# Patient Record
Sex: FEMALE | Race: WHITE | NOT HISPANIC OR LATINO | Employment: OTHER | URBAN - METROPOLITAN AREA
[De-identification: names, ages, dates, MRNs, and addresses within clinical notes are randomized per-mention and may not be internally consistent; named-entity substitution may affect disease eponyms.]

---

## 2021-08-10 ENCOUNTER — APPOINTMENT (EMERGENCY)
Dept: RADIOLOGY | Facility: HOSPITAL | Age: 21
End: 2021-08-10
Payer: COMMERCIAL

## 2021-08-10 ENCOUNTER — HOSPITAL ENCOUNTER (EMERGENCY)
Facility: HOSPITAL | Age: 21
Discharge: HOME | End: 2021-08-10
Attending: EMERGENCY MEDICINE
Payer: COMMERCIAL

## 2021-08-10 ENCOUNTER — APPOINTMENT (EMERGENCY)
Dept: VASCULAR ULTRASOUND | Facility: HOSPITAL | Age: 21
End: 2021-08-10
Payer: COMMERCIAL

## 2021-08-10 VITALS
BODY MASS INDEX: 25.69 KG/M2 | DIASTOLIC BLOOD PRESSURE: 73 MMHG | WEIGHT: 145 LBS | TEMPERATURE: 98.3 F | SYSTOLIC BLOOD PRESSURE: 129 MMHG | HEIGHT: 63 IN | OXYGEN SATURATION: 100 % | RESPIRATION RATE: 16 BRPM | HEART RATE: 97 BPM

## 2021-08-10 DIAGNOSIS — M79.604 RIGHT LEG PAIN: Primary | ICD-10-CM

## 2021-08-10 LAB
EXT PREG TEST URINE: NEGATIVE
EXT. CONTROL ED NAV: NORMAL

## 2021-08-10 PROCEDURE — 93971 EXTREMITY STUDY: CPT

## 2021-08-10 PROCEDURE — 99284 EMERGENCY DEPT VISIT MOD MDM: CPT

## 2021-08-10 PROCEDURE — 96372 THER/PROPH/DIAG INJ SC/IM: CPT

## 2021-08-10 PROCEDURE — 99284 EMERGENCY DEPT VISIT MOD MDM: CPT | Performed by: EMERGENCY MEDICINE

## 2021-08-10 PROCEDURE — 73590 X-RAY EXAM OF LOWER LEG: CPT

## 2021-08-10 PROCEDURE — 81025 URINE PREGNANCY TEST: CPT | Performed by: EMERGENCY MEDICINE

## 2021-08-10 PROCEDURE — 93971 EXTREMITY STUDY: CPT | Performed by: SURGERY

## 2021-08-10 PROCEDURE — 73552 X-RAY EXAM OF FEMUR 2/>: CPT

## 2021-08-10 RX ORDER — KETOROLAC TROMETHAMINE 30 MG/ML
15 INJECTION, SOLUTION INTRAMUSCULAR; INTRAVENOUS ONCE
Status: COMPLETED | OUTPATIENT
Start: 2021-08-10 | End: 2021-08-10

## 2021-08-10 RX ADMIN — KETOROLAC TROMETHAMINE 15 MG: 30 INJECTION, SOLUTION INTRAMUSCULAR; INTRAVENOUS at 12:41

## 2021-08-10 NOTE — ED PROVIDER NOTES
History  Chief Complaint   Patient presents with    Leg Pain     right leg pain since 0600, woke up with right sided pain starting in upper leg to calf  denies injury/trauma  denies back pain  History provided by:  Patient   used: No    Leg Pain  Associated symptoms: no fever and no neck pain      Patient is a 54-year-old female presenting to emergency department with right lower extremity pain since waking up today morning  States when she went to bed there was no pain  Pain is located from the calf up to the hip  No falls or trauma  No rashes  No skin changes  No back pain  No weakness  No numbness tingling or loss of sensation  No history of the same  Took aspirin at 6:00 a m  No recent travel  No recent hospital stays  No recent surgeries  No history of blood clots  On birth control  MDM x-ray right lower extremity, ultrasound, toradol for pain      None       History reviewed  No pertinent past medical history  History reviewed  No pertinent surgical history  History reviewed  No pertinent family history  I have reviewed and agree with the history as documented  E-Cigarette/Vaping     E-Cigarette/Vaping Substances     Social History     Tobacco Use    Smoking status: Never Smoker    Smokeless tobacco: Never Used   Substance Use Topics    Alcohol use: Not on file    Drug use: Not on file       Review of Systems   Constitutional: Negative for chills, diaphoresis and fever  HENT: Negative for congestion and sore throat  Respiratory: Negative for cough, shortness of breath, wheezing and stridor  Cardiovascular: Negative for chest pain, palpitations and leg swelling  Gastrointestinal: Negative for abdominal pain, blood in stool, diarrhea, nausea and vomiting  Genitourinary: Negative for dysuria, frequency and urgency  Musculoskeletal: Negative for neck pain and neck stiffness  Skin: Negative for pallor and rash     Neurological: Negative for dizziness, syncope, weakness, light-headedness and headaches  All other systems reviewed and are negative  Physical Exam  Physical Exam  Vitals reviewed  Constitutional:       Appearance: Normal appearance  She is well-developed  HENT:      Head: Normocephalic and atraumatic  Eyes:      Extraocular Movements: Extraocular movements intact  Pupils: Pupils are equal, round, and reactive to light  Cardiovascular:      Rate and Rhythm: Normal rate and regular rhythm  Heart sounds: Normal heart sounds  Pulmonary:      Effort: Pulmonary effort is normal  No respiratory distress  Breath sounds: Normal breath sounds  Abdominal:      General: Bowel sounds are normal       Palpations: Abdomen is soft  Tenderness: There is no abdominal tenderness  Musculoskeletal:         General: No swelling or tenderness  Normal range of motion  Cervical back: Normal range of motion and neck supple  Comments: Pain with range of motion of the right lower extremity mainly at the hip and thigh  No tenderness  No skin changes  Skin:     General: Skin is warm and dry  Capillary Refill: Capillary refill takes less than 2 seconds  Coloration: Skin is not jaundiced or pale  Neurological:      General: No focal deficit present  Mental Status: She is alert and oriented to person, place, and time           Vital Signs  ED Triage Vitals [08/10/21 1152]   Temperature Pulse Respirations Blood Pressure SpO2   98 3 °F (36 8 °C) 97 16 129/73 100 %      Temp Source Heart Rate Source Patient Position - Orthostatic VS BP Location FiO2 (%)   Oral Monitor -- Right arm --      Pain Score       6           Vitals:    08/10/21 1152   BP: 129/73   Pulse: 97         Visual Acuity      ED Medications  Medications   ketorolac (TORADOL) injection 15 mg (15 mg Intramuscular Given 8/10/21 1241)       Diagnostic Studies  Results Reviewed     Procedure Component Value Units Date/Time    POCT pregnancy, urine [[id number]]  (Normal) Resulted: 08/10/21 1226    Lab Status: Final result Updated: 08/10/21 1226     EXT PREG TEST UR (Ref: Negative) negative     Control valid                 VAS lower limb venous duplex study, unilateral/limited   Final Result by Carlin Garza DO (08/10 1333)      XR femur 2 views RIGHT   ED Interpretation by Sandy French MD (08/10 1243)   No acute abnormality      Final Result by Brenda Pak MD (08/10 1458)      No acute displaced fracture or dislocation            Workstation performed: [id number]         XR tibia fibula 2 views RIGHT   ED Interpretation by Sandy French MD (08/10 1243)   No acute abnormality      Final Result by Karime Jesus MD (08/10 1500)      No acute osseous abnormality  Workstation performed: [id number]                    Procedures  Procedures         ED Course  ED Course as of Aug 10 1656   Tue Aug 10, 2021   1316 Ultrasound negative for blood clot                                              MDM    Disposition  Final diagnoses:   Right leg pain     Time reflects when diagnosis was documented in both MDM as applicable and the Disposition within this note     Time User Action Codes Description Comment    8/10/2021  1:16 PM Symone Reynolds Add [V16 088] Right leg pain       ED Disposition     ED Disposition Condition Date/Time Comment    Discharge Stable Tue Aug 10, 2021  1:16  [facility] discharge to home/self care              Follow-up Information     Follow up With Specialties Details Why Contact Info Additional Information    [facility] Emergency Medicine  As needed, If symptoms worsen [address] Emergency Department, [address], [place], [place], Yazmin MASTERSON [address]  Call  Please call to get a family doctor and follow-up [phone]             There are no discharge medications for this patient  No discharge procedures on file      PDMP Review     None          ED Provider  Electronically Signed by           Victor M Bergeron MD  08/10/21 8554

## 2022-05-21 ENCOUNTER — HOSPITAL ENCOUNTER (EMERGENCY)
Facility: HOSPITAL | Age: 22
Discharge: HOME | End: 2022-05-22
Attending: EMERGENCY MEDICINE
Payer: COMMERCIAL

## 2022-05-21 ENCOUNTER — OFFICE VISIT (OUTPATIENT)
Dept: URGENT CARE | Facility: MEDICAL CENTER | Age: 22
End: 2022-05-21
Payer: COMMERCIAL

## 2022-05-21 VITALS
SYSTOLIC BLOOD PRESSURE: 137 MMHG | DIASTOLIC BLOOD PRESSURE: 66 MMHG | HEART RATE: 103 BPM | OXYGEN SATURATION: 96 % | TEMPERATURE: 98 F

## 2022-05-21 DIAGNOSIS — N94.9 ADNEXAL CYST: ICD-10-CM

## 2022-05-21 DIAGNOSIS — R11.2 NAUSEA & VOMITING: Primary | ICD-10-CM

## 2022-05-21 DIAGNOSIS — R11.2 NAUSEA AND VOMITING, UNSPECIFIED VOMITING TYPE: ICD-10-CM

## 2022-05-21 DIAGNOSIS — R10.9 ABDOMINAL PAIN: ICD-10-CM

## 2022-05-21 DIAGNOSIS — R10.11 RIGHT UPPER QUADRANT PAIN: Primary | ICD-10-CM

## 2022-05-21 LAB
BASOPHILS # BLD AUTO: 0.03 THOUSANDS/ΜL (ref 0–0.1)
BASOPHILS NFR BLD AUTO: 0 % (ref 0–1)
BILIRUB UR QL STRIP: NEGATIVE
CLARITY UR: CLEAR
COLOR UR: YELLOW
EOSINOPHIL # BLD AUTO: 0.11 THOUSAND/ΜL (ref 0–0.61)
EOSINOPHIL NFR BLD AUTO: 1 % (ref 0–6)
ERYTHROCYTE [DISTWIDTH] IN BLOOD BY AUTOMATED COUNT: 11.9 % (ref 11.6–15.1)
GLUCOSE UR STRIP-MCNC: NEGATIVE MG/DL
HCT VFR BLD AUTO: 42.5 % (ref 34.8–46.1)
HGB BLD-MCNC: 14.3 G/DL (ref 11.5–15.4)
HGB UR QL STRIP.AUTO: NEGATIVE
IMM GRANULOCYTES # BLD AUTO: 0.02 THOUSAND/UL (ref 0–0.2)
IMM GRANULOCYTES NFR BLD AUTO: 0 % (ref 0–2)
KETONES UR STRIP-MCNC: NEGATIVE MG/DL
LEUKOCYTE ESTERASE UR QL STRIP: ABNORMAL
LYMPHOCYTES # BLD AUTO: 3.15 THOUSANDS/ΜL (ref 0.6–4.47)
LYMPHOCYTES NFR BLD AUTO: 38 % (ref 14–44)
MCH RBC QN AUTO: 28.8 PG (ref 26.8–34.3)
MCHC RBC AUTO-ENTMCNC: 33.6 G/DL (ref 31.4–37.4)
MCV RBC AUTO: 86 FL (ref 82–98)
MONOCYTES # BLD AUTO: 0.58 THOUSAND/ΜL (ref 0.17–1.22)
MONOCYTES NFR BLD AUTO: 7 % (ref 4–12)
NEUTROPHILS # BLD AUTO: 4.5 THOUSANDS/ΜL (ref 1.85–7.62)
NEUTS SEG NFR BLD AUTO: 54 % (ref 43–75)
NITRITE UR QL STRIP: NEGATIVE
NRBC BLD AUTO-RTO: 0 /100 WBCS
PH UR STRIP.AUTO: 6 [PH]
PLATELET # BLD AUTO: 330 THOUSANDS/UL (ref 149–390)
PMV BLD AUTO: 8.7 FL (ref 8.9–12.7)
PROT UR STRIP-MCNC: ABNORMAL MG/DL
RBC # BLD AUTO: 4.96 MILLION/UL (ref 3.81–5.12)
SP GR UR STRIP.AUTO: 1.02 (ref 1–1.03)
UROBILINOGEN UR QL STRIP.AUTO: 0.2 E.U./DL
WBC # BLD AUTO: 8.39 THOUSAND/UL (ref 4.31–10.16)

## 2022-05-21 PROCEDURE — 83690 ASSAY OF LIPASE: CPT

## 2022-05-21 PROCEDURE — 85025 COMPLETE CBC W/AUTO DIFF WBC: CPT

## 2022-05-21 PROCEDURE — 36415 COLL VENOUS BLD VENIPUNCTURE: CPT

## 2022-05-21 PROCEDURE — 76705 ECHO EXAM OF ABDOMEN: CPT | Performed by: EMERGENCY MEDICINE

## 2022-05-21 PROCEDURE — 81001 URINALYSIS AUTO W/SCOPE: CPT

## 2022-05-21 PROCEDURE — 81025 URINE PREGNANCY TEST: CPT

## 2022-05-21 PROCEDURE — 99284 EMERGENCY DEPT VISIT MOD MDM: CPT | Performed by: EMERGENCY MEDICINE

## 2022-05-21 PROCEDURE — 99213 OFFICE O/P EST LOW 20 MIN: CPT | Performed by: PHYSICIAN ASSISTANT

## 2022-05-21 PROCEDURE — 80053 COMPREHEN METABOLIC PANEL: CPT

## 2022-05-21 PROCEDURE — 99284 EMERGENCY DEPT VISIT MOD MDM: CPT

## 2022-05-21 RX ORDER — ONDANSETRON 2 MG/ML
4 INJECTION INTRAMUSCULAR; INTRAVENOUS ONCE
Status: COMPLETED | OUTPATIENT
Start: 2022-05-21 | End: 2022-05-21

## 2022-05-21 RX ADMIN — SODIUM CHLORIDE 1000 ML: 0.9 INJECTION, SOLUTION INTRAVENOUS at 23:11

## 2022-05-21 RX ADMIN — ONDANSETRON 4 MG: 2 INJECTION INTRAMUSCULAR; INTRAVENOUS at 23:13

## 2022-05-21 NOTE — Clinical Note
Jamsinjazmin Ocasio was seen and treated in our emergency department on 5/21/2022  Diagnosis:     Deepika  may return to work on return date, may return to school on return date  She may return on this date: 05/25/2022    Can return sooner if feeling ok     If you have any questions or concerns, please don't hesitate to call        Oliver Berrios MD    ______________________________           _______________          _______________  Hospital Representative                              Date                                Time

## 2022-05-21 NOTE — PROGRESS NOTES
[id number]        NAME: Jorge Graf is a 24 y o  female  : 2000    MRN: [id number]  DATE: May 21, 2022  TIME: 7:46 PM    Assessment and Plan   Right upper quadrant pain [R10 11]  1  Right upper quadrant pain  Transfer to other facility   2  Nausea and vomiting, unspecified vomiting type  Transfer to other facility         Patient Instructions     Proceed to [facility] emergency department immediately  Chief Complaint     Chief Complaint   Patient presents with    Abdominal Pain     Vomiting, nausea, sharp right sided abdominal pain especially after eating         History of Present Illness       27-year-old female patient with a 3 day history of worsening right upper quadrant pain which she states is both crampy and sharp and nonradiating  She states that she has had intermittent vomiting with persistent nausea  She states that the pain is markedly worse with eating  She denies any diarrhea, urinary symptoms, vaginal symptoms  Denies any fever or chills  Denies any back pain  Patient was just at the gynecologist this past week and had a normal exam and was told that she was not pregnant  Review of Systems   Review of Systems   Constitutional: Positive for appetite change  Negative for chills and fever  HENT: Negative for ear pain and sore throat  Eyes: Negative for pain and visual disturbance  Respiratory: Negative for cough and shortness of breath  Cardiovascular: Negative for chest pain and palpitations  Gastrointestinal: Positive for abdominal pain, nausea and vomiting  Negative for blood in stool and diarrhea  Genitourinary: Negative for dysuria, flank pain, frequency, hematuria and urgency  Musculoskeletal: Negative for arthralgias and back pain  Skin: Negative for color change and rash  Neurological: Negative for dizziness, seizures and syncope  All other systems reviewed and are negative          Current Medications     No current outpatient medications on file  Current Allergies     Allergies as of 05/21/2022 - Reviewed 05/21/2022   Allergen Reaction Noted    Penicillins Other (See Comments) 05/21/2022    Prednisone Hallucinations 08/10/2021            The following portions of the patient's history were reviewed and updated as appropriate: allergies, current medications, past family history, past medical history, past social history, past surgical history and problem list      No past medical history on file  No past surgical history on file  No family history on file  Medications have been verified  Objective   /66   Pulse 103   Temp 98 °F (36 7 °C)   SpO2 96%        Physical Exam     Physical Exam  Vitals and nursing note reviewed  Constitutional:       General: She is in acute distress  Appearance: She is well-developed and normal weight  She is not ill-appearing  HENT:      Head: Normocephalic and atraumatic  Mouth/Throat:      Mouth: Mucous membranes are moist       Pharynx: Oropharynx is clear  Eyes:      Pupils: Pupils are equal, round, and reactive to light  Cardiovascular:      Rate and Rhythm: Regular rhythm  Tachycardia present  Heart sounds: Normal heart sounds  Pulmonary:      Effort: Pulmonary effort is normal       Breath sounds: Normal breath sounds  Abdominal:      General: Abdomen is flat  Bowel sounds are normal  There is no distension  Palpations: Abdomen is soft  Tenderness: There is abdominal tenderness in the right upper quadrant, epigastric area and periumbilical area  There is guarding  There is no right CVA tenderness, left CVA tenderness or rebound  Skin:     General: Skin is warm and dry  Capillary Refill: Capillary refill takes less than 2 seconds  Neurological:      Mental Status: She is alert and oriented to person, place, and time     Psychiatric:         Mood and Affect: Mood normal          Behavior: Behavior normal  Medical decision making note:   Due to 3 days of persistent pain which patient states is worsening, not being able to eat solid food, and tender on exam, will refer to [facility] emergency department for abdominal pain workup

## 2022-05-22 ENCOUNTER — APPOINTMENT (EMERGENCY)
Dept: CT IMAGING | Facility: HOSPITAL | Age: 22
End: 2022-05-22
Payer: COMMERCIAL

## 2022-05-22 VITALS
TEMPERATURE: 98.3 F | SYSTOLIC BLOOD PRESSURE: 102 MMHG | HEART RATE: 82 BPM | DIASTOLIC BLOOD PRESSURE: 59 MMHG | OXYGEN SATURATION: 98 % | RESPIRATION RATE: 18 BRPM

## 2022-05-22 LAB
ALBUMIN SERPL BCP-MCNC: 4.2 G/DL (ref 3.5–5)
ALP SERPL-CCNC: 64 U/L (ref 34–104)
ALT SERPL W P-5'-P-CCNC: 11 U/L (ref 7–52)
ANION GAP SERPL CALCULATED.3IONS-SCNC: 9 MMOL/L (ref 4–13)
AST SERPL W P-5'-P-CCNC: 14 U/L (ref 13–39)
BACTERIA UR QL AUTO: ABNORMAL /HPF
BILIRUB SERPL-MCNC: 0.6 MG/DL (ref 0.2–1)
BUN SERPL-MCNC: 7 MG/DL (ref 5–25)
CALCIUM SERPL-MCNC: 9 MG/DL (ref 8.4–10.2)
CHLORIDE SERPL-SCNC: 106 MMOL/L (ref 96–108)
CO2 SERPL-SCNC: 25 MMOL/L (ref 21–32)
CREAT SERPL-MCNC: 0.77 MG/DL (ref 0.6–1.3)
EXT PREG TEST URINE: NEGATIVE
EXT. CONTROL ED NAV: NORMAL
GFR SERPL CREATININE-BSD FRML MDRD: 110 ML/MIN/1.73SQ M
GLUCOSE SERPL-MCNC: 89 MG/DL (ref 65–140)
LIPASE SERPL-CCNC: 12 U/L (ref 11–82)
MUCOUS THREADS UR QL AUTO: ABNORMAL
NON-SQ EPI CELLS URNS QL MICRO: ABNORMAL /HPF
POTASSIUM SERPL-SCNC: 3.2 MMOL/L (ref 3.5–5.3)
PROT SERPL-MCNC: 7.1 G/DL (ref 6.4–8.4)
RBC #/AREA URNS AUTO: ABNORMAL /HPF
SODIUM SERPL-SCNC: 140 MMOL/L (ref 135–147)
WBC #/AREA URNS AUTO: ABNORMAL /HPF

## 2022-05-22 PROCEDURE — 74176 CT ABD & PELVIS W/O CONTRAST: CPT

## 2022-05-22 PROCEDURE — G1004 CDSM NDSC: HCPCS

## 2022-05-22 RX ORDER — FAMOTIDINE 20 MG/1
20 TABLET, FILM COATED ORAL AS NEEDED
Qty: 30 TABLET | Refills: 0 | Status: SHIPPED | OUTPATIENT
Start: 2022-05-22

## 2022-05-22 RX ORDER — ONDANSETRON 2 MG/ML
4 INJECTION INTRAMUSCULAR; INTRAVENOUS ONCE
Status: COMPLETED | OUTPATIENT
Start: 2022-05-22 | End: 2022-05-22

## 2022-05-22 RX ORDER — ONDANSETRON 4 MG/1
4 TABLET, ORALLY DISINTEGRATING ORAL EVERY 6 HOURS PRN
Qty: 20 TABLET | Refills: 0 | Status: SHIPPED | OUTPATIENT
Start: 2022-05-22

## 2022-05-22 RX ORDER — FAMOTIDINE 10 MG/ML
20 INJECTION, SOLUTION INTRAVENOUS ONCE
Status: COMPLETED | OUTPATIENT
Start: 2022-05-22 | End: 2022-05-22

## 2022-05-22 RX ORDER — KETOROLAC TROMETHAMINE 30 MG/ML
15 INJECTION, SOLUTION INTRAMUSCULAR; INTRAVENOUS ONCE
Status: COMPLETED | OUTPATIENT
Start: 2022-05-22 | End: 2022-05-22

## 2022-05-22 RX ORDER — POTASSIUM CHLORIDE 20 MEQ/1
40 TABLET, EXTENDED RELEASE ORAL ONCE
Status: COMPLETED | OUTPATIENT
Start: 2022-05-22 | End: 2022-05-22

## 2022-05-22 RX ADMIN — KETOROLAC TROMETHAMINE 15 MG: 30 INJECTION, SOLUTION INTRAMUSCULAR at 00:40

## 2022-05-22 RX ADMIN — FAMOTIDINE 20 MG: 10 INJECTION, SOLUTION INTRAVENOUS at 00:40

## 2022-05-22 RX ADMIN — ONDANSETRON 4 MG: 2 INJECTION INTRAMUSCULAR; INTRAVENOUS at 03:07

## 2022-05-22 RX ADMIN — POTASSIUM CHLORIDE 40 MEQ: 1500 TABLET, EXTENDED RELEASE ORAL at 00:47

## 2022-05-22 NOTE — ED ATTENDING ATTESTATION
5/21/2022  I, Cassius Carrel, MD, saw and evaluated the patient  I have discussed the patient with the resident/non-physician practitioner and agree with the resident's/non-physician practitioner's findings, Plan of Care, and MDM as documented in the resident's/non-physician practitioner's note, except where noted  All available labs and Radiology studies were reviewed  I was present for key portions of any procedure(s) performed by the resident/non-physician practitioner and I was immediately available to provide assistance  At this point I agree with the current assessment done in the Emergency Department  I have conducted an independent evaluation of this patient a history and physical is as follows:    ED Course         Critical Care Time  Procedures    Patient is a pleasant well appearing 24 yof who presents with RUQ pain  Achi  Present for the past several days  No vomiting or diarrhea  No chest pain  MDM pleasant 24 yof, concerned for gallbladder pathology, will image to rule out acute intraabdominal process
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
## 2023-06-15 ENCOUNTER — HOSPITAL ENCOUNTER (EMERGENCY)
Facility: HOSPITAL | Age: 23
Discharge: HOME | End: 2023-06-15
Attending: EMERGENCY MEDICINE
Payer: COMMERCIAL

## 2023-06-15 VITALS
HEART RATE: 79 BPM | DIASTOLIC BLOOD PRESSURE: 68 MMHG | SYSTOLIC BLOOD PRESSURE: 111 MMHG | OXYGEN SATURATION: 100 % | RESPIRATION RATE: 18 BRPM | TEMPERATURE: 99.1 F

## 2023-06-15 DIAGNOSIS — K59.00 CONSTIPATION: ICD-10-CM

## 2023-06-15 DIAGNOSIS — R10.9 RIGHT SIDED ABDOMINAL PAIN: ICD-10-CM

## 2023-06-15 DIAGNOSIS — R11.2 NAUSEA AND VOMITING: Primary | ICD-10-CM

## 2023-06-15 LAB
ALBUMIN SERPL BCP-MCNC: 4.6 G/DL (ref 3.5–5)
ALP SERPL-CCNC: 47 U/L (ref 34–104)
ALT SERPL W P-5'-P-CCNC: 12 U/L (ref 7–52)
ANION GAP SERPL CALCULATED.3IONS-SCNC: 12 MMOL/L (ref 4–13)
AST SERPL W P-5'-P-CCNC: 17 U/L (ref 13–39)
ATRIAL RATE: 90 BPM
BACTERIA UR QL AUTO: ABNORMAL /HPF
BASOPHILS # BLD AUTO: 0.03 THOUSANDS/ÂΜL (ref 0–0.1)
BASOPHILS NFR BLD AUTO: 0 % (ref 0–1)
BILIRUB SERPL-MCNC: 0.7 MG/DL (ref 0.2–1)
BILIRUB UR QL STRIP: NEGATIVE
BUN SERPL-MCNC: 13 MG/DL (ref 5–25)
CALCIUM SERPL-MCNC: 9.5 MG/DL (ref 8.4–10.2)
CARDIAC TROPONIN I PNL SERPL HS: <2 NG/L
CHLORIDE SERPL-SCNC: 105 MMOL/L (ref 96–108)
CLARITY UR: CLEAR
CO2 SERPL-SCNC: 23 MMOL/L (ref 21–32)
COLOR UR: YELLOW
CREAT SERPL-MCNC: 0.64 MG/DL (ref 0.6–1.3)
EOSINOPHIL # BLD AUTO: 0 THOUSAND/ÂΜL (ref 0–0.61)
EOSINOPHIL NFR BLD AUTO: 0 % (ref 0–6)
ERYTHROCYTE [DISTWIDTH] IN BLOOD BY AUTOMATED COUNT: 11.8 % (ref 11.6–15.1)
EXT PREGNANCY TEST URINE: NEGATIVE
EXT. CONTROL: NORMAL
GFR SERPL CREATININE-BSD FRML MDRD: 127 ML/MIN/1.73SQ M
GLUCOSE SERPL-MCNC: 95 MG/DL (ref 65–140)
GLUCOSE UR STRIP-MCNC: NEGATIVE MG/DL
HCT VFR BLD AUTO: 40.3 % (ref 34.8–46.1)
HGB BLD-MCNC: 13.4 G/DL (ref 11.5–15.4)
HGB UR QL STRIP.AUTO: NEGATIVE
IMM GRANULOCYTES # BLD AUTO: 0.01 THOUSAND/UL (ref 0–0.2)
IMM GRANULOCYTES NFR BLD AUTO: 0 % (ref 0–2)
KETONES UR STRIP-MCNC: ABNORMAL MG/DL
LEUKOCYTE ESTERASE UR QL STRIP: NEGATIVE
LIPASE SERPL-CCNC: 10 U/L (ref 11–82)
LYMPHOCYTES # BLD AUTO: 1.4 THOUSANDS/ÂΜL (ref 0.6–4.47)
LYMPHOCYTES NFR BLD AUTO: 17 % (ref 14–44)
MCH RBC QN AUTO: 29.3 PG (ref 26.8–34.3)
MCHC RBC AUTO-ENTMCNC: 33.3 G/DL (ref 31.4–37.4)
MCV RBC AUTO: 88 FL (ref 82–98)
MONOCYTES # BLD AUTO: 0.27 THOUSAND/ÂΜL (ref 0.17–1.22)
MONOCYTES NFR BLD AUTO: 3 % (ref 4–12)
MUCOUS THREADS UR QL AUTO: ABNORMAL
NEUTROPHILS # BLD AUTO: 6.64 THOUSANDS/ÂΜL (ref 1.85–7.62)
NEUTS SEG NFR BLD AUTO: 80 % (ref 43–75)
NITRITE UR QL STRIP: NEGATIVE
NON-SQ EPI CELLS URNS QL MICRO: ABNORMAL /HPF
NRBC BLD AUTO-RTO: 0 /100 WBCS
P AXIS: 78 DEGREES
PH UR STRIP.AUTO: 6 [PH]
PLATELET # BLD AUTO: 291 THOUSANDS/UL (ref 149–390)
PMV BLD AUTO: 8.7 FL (ref 8.9–12.7)
POTASSIUM SERPL-SCNC: 3.3 MMOL/L (ref 3.5–5.3)
PR INTERVAL: 136 MS
PROT SERPL-MCNC: 7.2 G/DL (ref 6.4–8.4)
PROT UR STRIP-MCNC: ABNORMAL MG/DL
QRS AXIS: 86 DEGREES
QRSD INTERVAL: 92 MS
QT INTERVAL: 388 MS
QTC INTERVAL: 474 MS
RBC # BLD AUTO: 4.57 MILLION/UL (ref 3.81–5.12)
RBC #/AREA URNS AUTO: ABNORMAL /HPF
SODIUM SERPL-SCNC: 140 MMOL/L (ref 135–147)
SP GR UR STRIP.AUTO: 1.03 (ref 1–1.03)
T WAVE AXIS: 67 DEGREES
UROBILINOGEN UR STRIP-ACNC: <2 MG/DL
VENTRICULAR RATE: 90 BPM
WBC # BLD AUTO: 8.35 THOUSAND/UL (ref 4.31–10.16)
WBC #/AREA URNS AUTO: ABNORMAL /HPF

## 2023-06-15 PROCEDURE — 99284 EMERGENCY DEPT VISIT MOD MDM: CPT | Performed by: PHYSICIAN ASSISTANT

## 2023-06-15 PROCEDURE — 83690 ASSAY OF LIPASE: CPT | Performed by: PHYSICIAN ASSISTANT

## 2023-06-15 PROCEDURE — 36415 COLL VENOUS BLD VENIPUNCTURE: CPT | Performed by: PHYSICIAN ASSISTANT

## 2023-06-15 PROCEDURE — 84484 ASSAY OF TROPONIN QUANT: CPT | Performed by: PHYSICIAN ASSISTANT

## 2023-06-15 PROCEDURE — 80053 COMPREHEN METABOLIC PANEL: CPT | Performed by: PHYSICIAN ASSISTANT

## 2023-06-15 PROCEDURE — 96374 THER/PROPH/DIAG INJ IV PUSH: CPT

## 2023-06-15 PROCEDURE — 99283 EMERGENCY DEPT VISIT LOW MDM: CPT

## 2023-06-15 PROCEDURE — 81001 URINALYSIS AUTO W/SCOPE: CPT | Performed by: PHYSICIAN ASSISTANT

## 2023-06-15 PROCEDURE — 85025 COMPLETE CBC W/AUTO DIFF WBC: CPT | Performed by: PHYSICIAN ASSISTANT

## 2023-06-15 PROCEDURE — 81025 URINE PREGNANCY TEST: CPT | Performed by: PHYSICIAN ASSISTANT

## 2023-06-15 PROCEDURE — 96375 TX/PRO/DX INJ NEW DRUG ADDON: CPT

## 2023-06-15 PROCEDURE — 96361 HYDRATE IV INFUSION ADD-ON: CPT

## 2023-06-15 PROCEDURE — 93005 ELECTROCARDIOGRAM TRACING: CPT

## 2023-06-15 RX ORDER — KETOROLAC TROMETHAMINE 30 MG/ML
15 INJECTION, SOLUTION INTRAMUSCULAR; INTRAVENOUS ONCE
Status: COMPLETED | OUTPATIENT
Start: 2023-06-15 | End: 2023-06-15

## 2023-06-15 RX ORDER — ONDANSETRON 2 MG/ML
4 INJECTION INTRAMUSCULAR; INTRAVENOUS ONCE
Status: COMPLETED | OUTPATIENT
Start: 2023-06-15 | End: 2023-06-15

## 2023-06-15 RX ORDER — ONDANSETRON 4 MG/1
4 TABLET, ORALLY DISINTEGRATING ORAL EVERY 6 HOURS PRN
Qty: 20 TABLET | Refills: 0 | Status: SHIPPED | OUTPATIENT
Start: 2023-06-15

## 2023-06-15 RX ADMIN — ONDANSETRON 4 MG: 2 INJECTION INTRAMUSCULAR; INTRAVENOUS at 15:29

## 2023-06-15 RX ADMIN — KETOROLAC TROMETHAMINE 15 MG: 30 INJECTION, SOLUTION INTRAMUSCULAR at 16:59

## 2023-06-15 RX ADMIN — SODIUM CHLORIDE 1000 ML: 0.9 INJECTION, SOLUTION INTRAVENOUS at 17:15

## 2023-06-15 RX ADMIN — SODIUM CHLORIDE 1000 ML: 0.9 INJECTION, SOLUTION INTRAVENOUS at 15:28

## 2023-06-15 NOTE — Clinical Note
Ryan Middleton was seen and treated in our emergency department on 6/15/2023  No restrictions            Diagnosis:     Deepika  may return to work on return date  She may return on this date: 06/20/2023         If you have any questions or concerns, please don't hesitate to call        Nadira Chung PA-C    ______________________________           _______________          _______________  Hospital Representative                              Date                                Time

## 2023-06-19 LAB
ATRIAL RATE: 90 BPM
P AXIS: 78 DEGREES
PR INTERVAL: 136 MS
QRS AXIS: 86 DEGREES
QRSD INTERVAL: 92 MS
QT INTERVAL: 388 MS
QTC INTERVAL: 474 MS
T WAVE AXIS: 67 DEGREES
VENTRICULAR RATE: 90 BPM

## 2023-06-19 PROCEDURE — 93010 ELECTROCARDIOGRAM REPORT: CPT | Performed by: INTERNAL MEDICINE

## 2023-07-07 NOTE — ED PROVIDER NOTES
History  Chief Complaint   Patient presents with   • Vomiting     Pt states she has been vomiting since Monday, also reports SOB and right sided abdominal pain  Denies diarrhea, states last BM was on Monday as well  Patient is a 20-year-old female presenting to the ED for evaluation of nausea and vomiting x4 days  Patient has had nausea, vomiting and right-sided abdominal pain over the past 4 days  She was seen at [facility] 2 days ago for the same symptoms and had a CT abdomen/pelvis that was unremarkable  She states that she was not feeling significantly improved after leaving that ED and has had continued nausea and vomiting  She states that she has Zofran at home but does not feel that it works and has not taken it  She states that she has not had a bowel movement since Monday  She denies any known fevers  She denies any hematemesis, diarrhea, vaginal discharge, vaginal bleeding or urinary symptoms  Patient states that she is also experiencing chest pain and shortness of breath when she is vomiting  She denies any pleuritic pain, hemoptysis, cough, unilateral calf pain/swelling, exogenous estrogen use, recent travel/surgery or history of PE/DVT  She admits to marijuana use and says that she cannot eat if she doesn't use it  She has not had any marijuana since Sunday  She denies any alcohol use  Prior to Admission Medications   Prescriptions Last Dose Informant Patient Reported? Taking?   famotidine (PEPCID) 20 mg tablet Unknown  No No   Sig: Take 1 tablet (20 mg total) by mouth if needed for heartburn   ondansetron (Zofran ODT) 4 mg disintegrating tablet 6/15/2023  No Yes   Sig: Take 1 tablet (4 mg total) by mouth every 6 (six) hours as needed for nausea or vomiting      Facility-Administered Medications: None       History reviewed  No pertinent past medical history  History reviewed  No pertinent surgical history  History reviewed  No pertinent family history    I have reviewed and agree with the history as documented  E-Cigarette/Vaping     E-Cigarette/Vaping Substances     Social History     Tobacco Use   • Smoking status: Never   • Smokeless tobacco: Never   Substance Use Topics   • Drug use: Yes     Types: Marijuana       Review of Systems   Constitutional: Positive for appetite change and chills  Negative for fatigue and fever  HENT: Negative for congestion, rhinorrhea and sore throat  Eyes: Negative for photophobia and visual disturbance  Respiratory: Negative for cough and shortness of breath  Cardiovascular: Negative for chest pain, palpitations and leg swelling  Gastrointestinal: Positive for abdominal pain, constipation, nausea and vomiting  Negative for blood in stool and diarrhea  Genitourinary: Negative for difficulty urinating, dysuria, flank pain, frequency, hematuria and urgency  Musculoskeletal: Negative for arthralgias, back pain, joint swelling, myalgias and neck pain  Skin: Negative for rash  Neurological: Negative for dizziness, syncope, weakness and headaches  All other systems reviewed and are negative  Physical Exam  Physical Exam  Vitals and nursing note reviewed  Constitutional:       General: She is awake  Appearance: Normal appearance  She is well-developed  She is not toxic-appearing or diaphoretic  HENT:      Head: Normocephalic and atraumatic  Right Ear: External ear normal       Left Ear: External ear normal       Nose: Nose normal       Mouth/Throat:      Lips: Pink  Mouth: Mucous membranes are moist    Eyes:      General: Lids are normal  No scleral icterus  Conjunctiva/sclera: Conjunctivae normal       Pupils: Pupils are equal, round, and reactive to light  Cardiovascular:      Rate and Rhythm: Normal rate and regular rhythm  Pulses: Normal pulses  Radial pulses are 2+ on the right side and 2+ on the left side        Heart sounds: Normal heart sounds, S1 normal and S2 normal  Pulmonary:      Effort: Pulmonary effort is normal  No accessory muscle usage  Breath sounds: Normal breath sounds  No stridor  No decreased breath sounds, wheezing, rhonchi or rales  Abdominal:      General: Abdomen is flat  Bowel sounds are normal  There is no distension  Palpations: Abdomen is soft  Tenderness: There is abdominal tenderness  There is no right CVA tenderness, left CVA tenderness, guarding or rebound  Comments: Mild generalized tenderness  No rebound tenderness, guarding or rigidity  Normal bowel sounds throughout  Musculoskeletal:      Cervical back: Full passive range of motion without pain and neck supple  No signs of trauma  No pain with movement  Right lower leg: No edema  Left lower leg: No edema  Lymphadenopathy:      Cervical: No cervical adenopathy  Skin:     General: Skin is warm and dry  Capillary Refill: Capillary refill takes less than 2 seconds  Coloration: Skin is not cyanotic, jaundiced or pale  Neurological:      Mental Status: She is alert and oriented to person, place, and time  GCS: GCS eye subscore is 4  GCS verbal subscore is 5  GCS motor subscore is 6  Gait: Gait normal    Psychiatric:         Mood and Affect: Mood normal          Speech: Speech normal          Behavior: Behavior is cooperative           Vital Signs  ED Triage Vitals [06/15/23 1501]   Temperature Pulse Respirations Blood Pressure SpO2   99 1 °F (37 3 °C) 85 18 138/81 100 %      Temp Source Heart Rate Source Patient Position - Orthostatic VS BP Location FiO2 (%)   Oral Monitor Sitting Right arm --      Pain Score       9           Vitals:    06/15/23 1501 06/15/23 1700   BP: 138/81 111/68   Pulse: 85 79   Patient Position - Orthostatic VS: Sitting          Visual Acuity      ED Medications  Medications   sodium chloride 0 9 % bolus 1,000 mL (0 mL Intravenous Stopped 6/15/23 1705)   ondansetron (ZOFRAN) injection 4 mg (4 mg Intravenous Given 6/15/23 1529)   ketorolac (TORADOL) injection 15 mg (15 mg Intravenous Given 6/15/23 1659)   sodium chloride 0 9 % bolus 1,000 mL (0 mL Intravenous Stopped 6/15/23 1738)       Diagnostic Studies  Results Reviewed     Procedure Component Value Units Date/Time    Urine Microscopic [[id number]]  (Abnormal) Collected: 06/15/23 1608    Lab Status: Final result Specimen: Urine, Clean Catch Updated: 06/15/23 1715     RBC, UA 1-2 /hpf      WBC, UA 4-10 /hpf      Epithelial Cells Occasional /hpf      Bacteria, UA Occasional /hpf      MUCUS THREADS Innumerable    UA w Reflex to Microscopic w Reflex to Culture [[id number]]  (Abnormal) Collected: 06/15/23 1608    Lab Status: Final result Specimen: Urine, Clean Catch Updated: 06/15/23 1650     Color, UA Yellow     Clarity, UA Clear     Specific Gravity, UA 1 034     pH, UA 6 0     Leukocytes, UA Negative     Nitrite, UA Negative     Protein,  (2+) mg/dl      Glucose, UA Negative mg/dl      Ketones, UA >=150 (4+) mg/dl      Urobilinogen, UA <2 0 mg/dl      Bilirubin, UA Negative     Occult Blood, UA Negative    HS Troponin 0hr (reflex protocol) [[id number]]  (Normal) Collected: 06/15/23 1527    Lab Status: Final result Specimen: Blood from Arm, Left Updated: 06/15/23 1615     hs TnI 0hr <2 ng/L     Comprehensive metabolic panel [[id number]]  (Abnormal) Collected: 06/15/23 1527    Lab Status: Final result Specimen: Blood from Arm, Left Updated: 06/15/23 1614     Sodium 140 mmol/L      Potassium 3 3 mmol/L      Chloride 105 mmol/L      CO2 23 mmol/L      ANION GAP 12 mmol/L      BUN 13 mg/dL      Creatinine 0 64 mg/dL      Glucose 95 mg/dL      Calcium 9 5 mg/dL      AST 17 U/L      ALT 12 U/L      Alkaline Phosphatase 47 U/L      Total Protein 7 2 g/dL      Albumin 4 6 g/dL      Total Bilirubin 0 70 mg/dL      eGFR 127 ml/min/1 73sq m     Narrative:      Shreyas guidelines for Chronic Kidney Disease (CKD):   •  Stage 1 with normal or high GFR (GFR > 90 mL/min/1 73 square meters)  •  Stage 2 Mild CKD (GFR = 60-89 mL/min/1 73 square meters)  •  Stage 3A Moderate CKD (GFR = 45-59 mL/min/1 73 square meters)  •  Stage 3B Moderate CKD (GFR = 30-44 mL/min/1 73 square meters)  •  Stage 4 Severe CKD (GFR = 15-29 mL/min/1 73 square meters)  •  Stage 5 End Stage CKD (GFR <15 mL/min/1 73 square meters)  Note: GFR calculation is accurate only with a steady state creatinine    Lipase [[id number]]  (Abnormal) Collected: 06/15/23 1527    Lab Status: Final result Specimen: Blood from Arm, Left Updated: 06/15/23 1614     Lipase 10 u/L     POCT pregnancy, urine [[id number]]  (Normal) Resulted: 06/15/23 1612    Lab Status: Final result Updated: 06/15/23 1612     EXT Preg Test, Ur Negative     Control Valid    CBC and differential [[id number]]  (Abnormal) Collected: 06/15/23 1527    Lab Status: Final result Specimen: Blood from Arm, Left Updated: 06/15/23 1538     WBC 8 35 Thousand/uL      RBC 4 57 Million/uL      Hemoglobin 13 4 g/dL      Hematocrit 40 3 %      MCV 88 fL      MCH 29 3 pg      MCHC 33 3 g/dL      RDW 11 8 %      MPV 8 7 fL      Platelets 043 Thousands/uL      nRBC 0 /100 WBCs      Neutrophils Relative 80 %      Immat GRANS % 0 %      Lymphocytes Relative 17 %      Monocytes Relative 3 %      Eosinophils Relative 0 %      Basophils Relative 0 %      Neutrophils Absolute 6 64 Thousands/µL      Immature Grans Absolute 0 01 Thousand/uL      Lymphocytes Absolute 1 40 Thousands/µL      Monocytes Absolute 0 27 Thousand/µL      Eosinophils Absolute 0 00 Thousand/µL      Basophils Absolute 0 03 Thousands/µL                  No orders to display              Procedures  ECG 12 Lead Documentation Only    Date/Time: 6/15/2023 3:53 PM    Performed by: Naty Fierro PA-C  Authorized by: Naty Fierro PA-C    Indications / Diagnosis:  Cp  ECG reviewed by me, the ED Provider: yes    Patient location:  ED  Previous ECG:     Previous ECG:  Compared to current    Comparison ECG info:  1/20/11    Comparison to cardiac monitor: Yes    Interpretation:     Interpretation: normal    Rate:     ECG rate:  90    ECG rate assessment: normal    Rhythm:     Rhythm: sinus rhythm    Ectopy:     Ectopy: none    QRS:     QRS axis:  Normal    QRS intervals:  Normal  Conduction:     Conduction: normal    ST segments:     ST segments:  Normal  T waves:     T waves: normal    Comments:      No STEMI  QT/QTc 388/474             ED Course             HEART Risk Score    Flowsheet Row Most Recent Value   Heart Score Risk Calculator    History 0 Filed at: 06/15/2023 1619   ECG 0 Filed at: 06/15/2023 1619   Age 0 Filed at: 06/15/2023 1619   Risk Factors 0 Filed at: 06/15/2023 1619   Troponin 0 Filed at: 06/15/2023 1619   HEART Score 0 Filed at: 06/15/2023 1619                PERC Rule for PE    Flowsheet Row Most Recent Value   PERC Rule for PE    Age >=50 0 Filed at: 06/15/2023 1619   HR >=100 0 Filed at: 06/15/2023 1619   O2 Sat on room air < 95% 0 Filed at: 06/15/2023 1619   History of PE or DVT 0 Filed at: 06/15/2023 1619   Recent trauma or surgery 0 Filed at: 06/15/2023 1619   Hemoptysis 0 Filed at: 06/15/2023 1619   Exogenous estrogen 0 Filed at: 06/15/2023 1619   Unilateral leg swelling 0 Filed at: 06/15/2023 1619   [address]  Rule for PE Results 0 Filed at: 06/15/2023 1619                            Medical Decision Making  Patient is a 59-year-old female presenting to the ED for evaluation of nausea and vomiting x4 days  DDx including but not limited to: gastroenteritis, marijuana hyperemesis, food poisoning, viral illness, GI etiology  Will obtain labs and give IV fluids, anti-emetics  I personally reviewed patient's labs which are notable for a very mild hypokalemia but otherwise unremarkable  Patient has mild generalized abdominal tenderness and says the pain is not worsened or changed since the CT she had 2 days ago, no indication for repeat imaging at this time   Patient was given Toradol and Zofran with improvement  She is tolerating PO with no subsequent vomiting  She was given a prescription for zofran and referral to GI for follow-up  Encouraged patient to discontinue marijuana use as this may be contributing to symptoms  Advised her to return if she developed any intractable vomiting, worsening pain, fevers, etc      The management plan was discussed in detail with the patient at bedside and all questions were answered  Strict ED return instructions were discussed at bedside  Prior to discharge, both verbal and written instructions were provided  We discussed the signs and symptoms that should prompt the patient to return to the ED  All questions were answered and the patient was comfortable with the plan of care and discharged home  The patient agrees to return to the Emergency Department for concerns and/or progression of illness  Constipation: acute illness or injury  Nausea and vomiting: acute illness or injury  Right sided abdominal pain: acute illness or injury  Amount and/or Complexity of Data Reviewed  Labs: ordered  Risk  Prescription drug management  Disposition  Final diagnoses:   Nausea and vomiting   Right sided abdominal pain   Constipation     Time reflects when diagnosis was documented in both MDM as applicable and the Disposition within this note     Time User Action Codes Description Comment    6/15/2023  5:33 PM Son Beat Add [R11 2] Nausea and vomiting     6/15/2023  5:33 PM Son Beat Add [R10 9] Right sided abdominal pain     6/15/2023  5:33 PM Son Beat Add [K59 00] Constipation       ED Disposition     ED Disposition   Discharge    Condition   Stable    Date/Time   Thu Charles 15, 2023  5:33 PM    Comment   Deepika Bernardo discharge to home/self care                 Follow-up Information     Follow up With Specialties Details Why Contact Info Additional Information    [facility] Schedule an appointment as soon as possible for a visit   Kevin Shi [address] [facility] [place], [address], [place], [place], [address] [facility]    [address] Emergency Department Emergency Medicine  If symptoms worsen [address] [facility] [address] [facility] Emergency Department, [address], [place], [place], [address]          Discharge Medication List as of 6/15/2023  5:34 PM      START taking these medications    Details   !! ondansetron (ZOFRAN-ODT) 4 mg disintegrating tablet Take 1 tablet (4 mg total) by mouth every 6 (six) hours as needed for nausea or vomiting, Starting Thu 6/15/2023, Normal       !! - Potential duplicate medications found  Please discuss with provider  CONTINUE these medications which have NOT CHANGED    Details   !! ondansetron (Zofran ODT) 4 mg disintegrating tablet Take 1 tablet (4 mg total) by mouth every 6 (six) hours as needed for nausea or vomiting, Starting Sun 5/22/2022, Normal      famotidine (PEPCID) 20 mg tablet Take 1 tablet (20 mg total) by mouth if needed for heartburn, Starting Sun 5/22/2022, Normal       !! - Potential duplicate medications found  Please discuss with provider  No discharge procedures on file      PDMP Review     None          ED Provider  Electronically Signed by           Stephy Galicia PA-C  06/17/23 7214 No

## 2023-10-06 ENCOUNTER — TELEPHONE (OUTPATIENT)
Dept: PSYCHIATRY | Facility: CLINIC | Age: 23
End: 2023-10-06

## 2023-10-06 NOTE — TELEPHONE ENCOUNTER
Patient has been added to the [facility] wait list without a referral.    Insurance[facility] Type:    Commercial []   Medicaid [x]   [place] (if applicable)   Medicare []  Location Preference: [facility] or [facility]  Provider Preference: Female  Virtual: Yes [x] No []  Were outside resources sent: Yes [x] No []  Advised the patient to contact her PCP for a referral.

## 2024-04-02 ENCOUNTER — EVALUATION (OUTPATIENT)
Dept: PHYSICAL THERAPY | Age: 24
End: 2024-04-02
Payer: COMMERCIAL

## 2024-04-02 DIAGNOSIS — M21.41 PES PLANUS OF BOTH FEET: ICD-10-CM

## 2024-04-02 DIAGNOSIS — M79.672 BILATERAL LEG AND FOOT PAIN: Primary | ICD-10-CM

## 2024-04-02 DIAGNOSIS — M76.821 POSTERIOR TIBIALIS TENDINITIS OF BOTH LOWER EXTREMITIES: ICD-10-CM

## 2024-04-02 DIAGNOSIS — M21.42 PES PLANUS OF BOTH FEET: ICD-10-CM

## 2024-04-02 DIAGNOSIS — M79.604 BILATERAL LEG AND FOOT PAIN: Primary | ICD-10-CM

## 2024-04-02 DIAGNOSIS — M79.671 BILATERAL LEG AND FOOT PAIN: Primary | ICD-10-CM

## 2024-04-02 DIAGNOSIS — M76.822 POSTERIOR TIBIALIS TENDINITIS OF BOTH LOWER EXTREMITIES: ICD-10-CM

## 2024-04-02 DIAGNOSIS — M79.605 BILATERAL LEG AND FOOT PAIN: Primary | ICD-10-CM

## 2024-04-02 PROCEDURE — 97161 PT EVAL LOW COMPLEX 20 MIN: CPT

## 2024-04-02 PROCEDURE — 97110 THERAPEUTIC EXERCISES: CPT

## 2024-04-02 NOTE — PROGRESS NOTES
PT Evaluation     Today's date: 2024  Patient name: Nanda Bernardo  : 2000  MRN: [id number]  Referring provider: Yvan De Santiago*  Dx:   Encounter Diagnosis     ICD-10-CM    1. Bilateral leg and foot pain  M79.604     M79.605     M79.671     M79.672       2. Posterior tibialis tendinitis of both lower extremities  M76.821     M76.822       3. Pes planus of both feet  M21.41     M21.42           Start Time: 843  Stop Time: 930  Total time in clinic (min): 47 minutes    Assessment  Assessment details: Pt is a 22 y/o F who presents with bilateral foot and ankle pain. Pt demonstrates decreased proprioception with static balance, decreased strength in ankles, decreased walking tolerance and inability to participate in functional activities like running regularly.   Chart reviewed, shows multiple instances of R and L sprains  Pt joints very flexible  Pt advised to consult with rheumatology given the possibility of a connective tissue disorder--Positive on Beighton scale    Pt has ankle instability bilaterally and decreased strength which limits her overall activity participation and ADLs at work and at home.  Pt has a positive prognosis. Pt would benefit from PT to address these impairments leading to increased functional capacity and improved quality of life.    Impairments: abnormal or restricted ROM, impaired physical strength, lacks appropriate home exercise program, pain with function, poor posture  and poor body mechanics  Understanding of Dx/Px/POC: good   Prognosis: good    Goals  In 2-4 weeks:  Pt will improve pain on VAPS by 1-2 points indicating improved function  Pt will demonstrate independence and understanding of HEP  Pt will demonstrate improved navigation up and down stairs independently     In 6-8 weeks:  Pt will demonstrate improved ROM >110 adequate for ascending/descending stairs  Pt will demonstrate SL heel raise test within 10% of affected side  Pt will demonstrate squat  "with normal mechanics  Pt will demonstrate symmetrical SLS and SLS with EC indicating improved proprioception  Pt will tolerate 30 minutes of running with improved pain and mechanics      Plan  Patient would benefit from: skilled physical therapy  Planned modality interventions: cryotherapy and thermotherapy: hydrocollator packs  Planned therapy interventions: neuromuscular re-education, patient education, stretching, strengthening, therapeutic activities, therapeutic exercise, therapeutic training, home exercise program and graded activity  Frequency: 2x week (Twice a week for weeks)  Duration in weeks: 8  Treatment plan discussed with: patient        Subjective Evaluation    History of Present Illness  Mechanism of injury: 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  Previous [employer] work 2 yrs ago  Reports bilateral feet numbness occasionally  Pt is a runner--runs about a half hour-started back in February-took a month or so break  2-4 runs per week, 30 mins, about 2 mi      Pain  Current pain ratin  At worst pain rating: 10          Objective     Neurological Testing     Sensation     Ankle/Foot   Left Ankle/Foot   Intact: light touch    Right Ankle/Foot   Intact: light touch     Active Range of Motion   Left Ankle/Foot   Normal active range of motion  Dorsiflexion (ke): 15 degrees   Inversion: 40 degrees     Right Ankle/Foot   Normal active range of motion  Dorsiflexion (ke): 15 degrees   Inversion: 40 degrees     Additional Active Range of Motion Details  hypermobile    Joint Play   Left Ankle/Foot  Hypermobile in the talocrural joint, subtalar joint, midfoot and forefoot.     Right Ankle/Foot  Hypermobile in the talocrural joint, subtalar joint, midfoot and forefoot.      Strength/Myotome Testing     Left " Ankle/Foot   Dorsiflexion: 4  Plantar flexion: 4  Inversion: 4  Eversion: 4    Right Ankle/Foot   Dorsiflexion: 4  Plantar flexion: 4  Inversion: 4  Eversion: 4    Additional Strength Details  Pain in general     Functional Assessment      Squat    Left within functional limits, right within functional limits, left valgus and right valgus.     Single Leg Squat   Left Leg  Unable to perform.     Single Leg Stance - Eyes Open   Left  Trial 1: 5 seconds    Right  Trial 1: 5 seconds    Comments  HEEL RAISE:   -bilateral x 20 able  -single leg x 5 with instability noted, decreased control             Precautions: see chart    Visit/Unit Tracking  AUTH Status:  Date 4/2              [employer]/Medicaid Used                Remaining                                      Manuals 4/2                                                                Neuro Re-Ed                                                                                                        Ther Ex             Band ankle inv Gtb  3x15            Toe yoga             Short foot iso             Tib post heel raise Ball   Bw   Heels  3x  10-15            Bent knee heel raise                                                    Ther Activity                                       Gait Training                                       Modalities                                            HOME EXERCISE PROGRAM [[id number]] HEP2Go    Short Foot -  Repeat 10 Repetitions, Hold 5 Seconds, Complete 2 Sets, Perform 2 Times a Day    Reciprocal Toe Extension -  Repeat 15 Repetitions, Hold 1 Second(s), Complete 2 Sets, Perform 2 Times a Day    Toe Splay -  Repeat 10 Repetitions, Hold 5 Seconds, Complete 2 Sets, Perform 2 Times a Day    HEEL RAISE - CALF RAISE - STANDING BALL SQUEEZE  -  Repeat 15 Repetitions, Hold 1 Second(s), Complete 3 Sets, Perform 1 Times a Day    Bent Knee Heel Raise / Soleus Heel Raise -  Repeat 15 Repetitions, Hold 1 Second(s), Complete 3 Sets,  Perform 1 Times a Day

## 2024-04-05 ENCOUNTER — OFFICE VISIT (OUTPATIENT)
Dept: PHYSICAL THERAPY | Age: 24
End: 2024-04-05
Payer: COMMERCIAL

## 2024-04-05 DIAGNOSIS — M21.41 PES PLANUS OF BOTH FEET: ICD-10-CM

## 2024-04-05 DIAGNOSIS — M76.822 POSTERIOR TIBIALIS TENDINITIS OF BOTH LOWER EXTREMITIES: ICD-10-CM

## 2024-04-05 DIAGNOSIS — M79.671 BILATERAL LEG AND FOOT PAIN: Primary | ICD-10-CM

## 2024-04-05 DIAGNOSIS — M79.604 BILATERAL LEG AND FOOT PAIN: Primary | ICD-10-CM

## 2024-04-05 DIAGNOSIS — M21.42 PES PLANUS OF BOTH FEET: ICD-10-CM

## 2024-04-05 DIAGNOSIS — M76.821 POSTERIOR TIBIALIS TENDINITIS OF BOTH LOWER EXTREMITIES: ICD-10-CM

## 2024-04-05 DIAGNOSIS — M79.605 BILATERAL LEG AND FOOT PAIN: Primary | ICD-10-CM

## 2024-04-05 DIAGNOSIS — M79.672 BILATERAL LEG AND FOOT PAIN: Primary | ICD-10-CM

## 2024-04-05 PROCEDURE — 97110 THERAPEUTIC EXERCISES: CPT

## 2024-04-05 PROCEDURE — 97112 NEUROMUSCULAR REEDUCATION: CPT

## 2024-04-05 PROCEDURE — 97530 THERAPEUTIC ACTIVITIES: CPT

## 2024-04-05 NOTE — PROGRESS NOTES
Daily Note     Today's date: 2024  Patient name: Nanda Bernardo  : 2000  MRN: [id number]  Referring provider: Yvan De Santiago*  Dx:   Encounter Diagnosis     ICD-10-CM    1. Bilateral leg and foot pain  M79.604     M79.605     M79.671     M79.672       2. Posterior tibialis tendinitis of both lower extremities  M76.821     M76.822       3. Pes planus of both feet  M21.41     M21.42           Start Time: 727  Stop Time: 815  Total time in clinic (min): 48 minutes    Subjective: Pt reports clinically no big changes.  Pain in bottoms of feet noted.       Objective: See treatment diary below      Assessment: Cues to set up HEP. Cues to improve performance of SLS or stride throw with unstable surface.  Instability with dynamic balance activities noted.  Cues to slow down and improve sequencing used.  Pt tolerance to activity appears increased. Tolerated treatment well. Patient demonstrated fatigue post treatment      Plan: Continue per plan of care.      Precautions: see chart    Visit/Unit Tracking  AUTH Status:  Date              [employer]/Medicaid Used 1 2              Remaining   23                                   Manuals                                                                Neuro Re-Ed             Single leg,  No shoe,  Airex  Stride  SLS/  Throw gmb  2x20 ea           Tandem-DB pass  GMB  3x10  Fw/bw           Lateral walk-  Lenard  10#  8x ea                                                               Ther Ex             Band ankle inv Gtb  3x15 3x12  gtb           Toe yoga  HEP           Short foot iso  HEP           Tib post heel raise Ball   Bw   Heels  3x  10-15 3x10           Bent knee heel raise  nv           Bike/TM  10'  5%   2.5 mph           Great toe flexion-band  3x10  gtb                                                  Plank-  Add mt climber                          Ther Activity             Gait analysis:  Runing             A skip  3  lap  20'           B skip  3 lap  20'           Squat  NV, Cue ankle position                                     Gait Training                                       Modalities

## 2024-04-09 ENCOUNTER — OFFICE VISIT (OUTPATIENT)
Dept: PHYSICAL THERAPY | Age: 24
End: 2024-04-09
Payer: COMMERCIAL

## 2024-04-09 DIAGNOSIS — M79.671 BILATERAL LEG AND FOOT PAIN: Primary | ICD-10-CM

## 2024-04-09 DIAGNOSIS — M21.41 PES PLANUS OF BOTH FEET: ICD-10-CM

## 2024-04-09 DIAGNOSIS — M79.604 BILATERAL LEG AND FOOT PAIN: Primary | ICD-10-CM

## 2024-04-09 DIAGNOSIS — M21.42 PES PLANUS OF BOTH FEET: ICD-10-CM

## 2024-04-09 DIAGNOSIS — M76.821 POSTERIOR TIBIALIS TENDINITIS OF BOTH LOWER EXTREMITIES: ICD-10-CM

## 2024-04-09 DIAGNOSIS — M79.605 BILATERAL LEG AND FOOT PAIN: Primary | ICD-10-CM

## 2024-04-09 DIAGNOSIS — M76.822 POSTERIOR TIBIALIS TENDINITIS OF BOTH LOWER EXTREMITIES: ICD-10-CM

## 2024-04-09 DIAGNOSIS — M79.672 BILATERAL LEG AND FOOT PAIN: Primary | ICD-10-CM

## 2024-04-09 PROCEDURE — 97110 THERAPEUTIC EXERCISES: CPT

## 2024-04-09 PROCEDURE — 97112 NEUROMUSCULAR REEDUCATION: CPT

## 2024-04-09 PROCEDURE — 97140 MANUAL THERAPY 1/> REGIONS: CPT

## 2024-04-09 NOTE — PROGRESS NOTES
Daily Note     Today's date: 2024  Patient name: Nanda Bernardo  : 2000  MRN: [id number]  Referring provider: Yvan De Santiago*  Dx:   Encounter Diagnosis     ICD-10-CM    1. Bilateral leg and foot pain  M79.604     M79.605     M79.671     M79.672       2. Posterior tibialis tendinitis of both lower extremities  M76.821     M76.822       3. Pes planus of both feet  M21.41     M21.42             Start Time: 1015  Stop Time: 1100  Total time in clinic (min): 45 minutes    Subjective: Pt reports clinically no big changes.  Pain in bottoms of feet noted. Pt also has compression sleeves for ankle       Objective: See treatment diary below      Assessment: Cues to set up HEP. Cues to improve performance of SLS or stride throw with unstable surface.  Instability with dynamic balance activities noted.  Cues to slow down and improve sequencing used.  Pt tolerance to activity appears increased. Tolerated treatment well. Patient demonstrated fatigue post treatment      Plan: Continue per plan of care.      Precautions: see chart    Visit/Unit Tracking  AUTH Status:  Date             [employer]/Medicaid Used 1 2 3             Remaining   23 22                                  Manuals              8'  Iastm  ROM  mobs                                                 Neuro Re-Ed             Single leg,  No shoe,  Airex  Stride  SLS/  Throw gmb  2x20 ea Tandm  Airex  HT w  numbr          Tandem-DB pass  GMB  3x10  Fw/bw 3x10          Lateral walk-  [place]  10#  8x ea nv                                                              Ther Ex             Band ankle inv Gtb  3x15 3x12  gtb nv          Toe yoga  HEP           Short foot iso  HEP           Tib post heel raise Ball   Bw   Heels  3x  10-15 3x10 3x10  gmb          Bent knee heel raise  nv Lunge  stance  2x10 ea          Bike/TM  10'  5%   2.5 mph 10'  3%  2.5  mph          Great toe flexion-band  3x10  gtb Gtb  3x10                                                               Mt climber at [place]   nv                                    Ther Activity             Gait analysis:  Runing             A skip  3 lap  20' nv          B skip  3 lap  20' nv          Squat  NV, Cue ankle position nv                                    Gait Training                                       Modalities

## 2024-04-11 ENCOUNTER — OFFICE VISIT (OUTPATIENT)
Dept: PHYSICAL THERAPY | Age: 24
End: 2024-04-11
Payer: COMMERCIAL

## 2024-04-11 DIAGNOSIS — M76.822 POSTERIOR TIBIALIS TENDINITIS OF BOTH LOWER EXTREMITIES: ICD-10-CM

## 2024-04-11 DIAGNOSIS — M79.671 BILATERAL LEG AND FOOT PAIN: Primary | ICD-10-CM

## 2024-04-11 DIAGNOSIS — M21.41 PES PLANUS OF BOTH FEET: ICD-10-CM

## 2024-04-11 DIAGNOSIS — M21.42 PES PLANUS OF BOTH FEET: ICD-10-CM

## 2024-04-11 DIAGNOSIS — M79.604 BILATERAL LEG AND FOOT PAIN: Primary | ICD-10-CM

## 2024-04-11 DIAGNOSIS — M79.672 BILATERAL LEG AND FOOT PAIN: Primary | ICD-10-CM

## 2024-04-11 DIAGNOSIS — M76.821 POSTERIOR TIBIALIS TENDINITIS OF BOTH LOWER EXTREMITIES: ICD-10-CM

## 2024-04-11 DIAGNOSIS — M79.605 BILATERAL LEG AND FOOT PAIN: Primary | ICD-10-CM

## 2024-04-11 PROCEDURE — 97110 THERAPEUTIC EXERCISES: CPT

## 2024-04-11 PROCEDURE — 97112 NEUROMUSCULAR REEDUCATION: CPT

## 2024-04-11 PROCEDURE — 97140 MANUAL THERAPY 1/> REGIONS: CPT

## 2024-04-11 NOTE — PROGRESS NOTES
"Daily Note     Today's date: 2024  Patient name: Nanda Bernardo  : 2000  MRN: [id number]  Referring provider: Yvan De Santiago*  Dx:   Encounter Diagnosis     ICD-10-CM    1. Bilateral leg and foot pain  M79.604     M79.605     M79.671     M79.672       2. Pes planus of both feet  M21.41     M21.42       3. Posterior tibialis tendinitis of both lower extremities  M76.821     M76.822                        Subjective: Patient had recheck with podiatrist earlier today.  Was issued a night splint to try at night.       Objective: See treatment diary below      Assessment: Frequent verbal cues for proper technique with exercises working on control.  Patient was educated in wearing her arch support inserts to start on first day for one hour, second day 2 hours and progress one hour every day until able to tolerate wearing all day long.  Patient verbalizes understanding.  Tolerated treatment well. Patient demonstrated fatigue post treatment      Plan: Continue per plan of care.      Precautions: see chart    Visit/Unit Tracking  AUTH Status:  Date            [employer]/Medicaid Used 1 2 3 4            Remaining   23 22 21                                 Manuals             8'  Iastm  ROM  mobs 8'  AROM all planes                                                Neuro Re-Ed             Single leg,  No shoe,  Airex  Stride  SLS/  Throw gmb  2x20 ea Tandm  Airex  HT w  numbr Tandem  Airex HT w numbr         Tandem-DB pass  GMB  3x10  Fw/bw 3x10 3x10         Lateral walk-  [place]  10#  8x ea nv 10#  8x ea                                                             Ther Ex             Band ankle inv Gtb  3x15 3x12  gtb nv 3x12 gtb         Toe yoga  HEP           Short foot iso  HEP  5\" x20         Tib post heel raise Ball   Bw   Heels  3x  10-15 3x10 3x10  gmb 3x10 Green ball         Bent knee heel raise  nv Lunge  stance  2x10 ea Lunge stance 2x10 ea       "   Bike/TM  10'  5%   2.5 mph 10'  3%  2.5  mph TM10'3% 2.5 mph         Great toe flexion-band  3x10  gtb Gtb  3x10                                                              Mt climber at wall   nv                                    Ther Activity             Gait analysis:  Runing             A skip  3 lap  20' nv          B skip  3 lap  20' nv          Squat  NV, Cue ankle position nv                                    Gait Training                                       Modalities

## 2024-04-15 ENCOUNTER — OFFICE VISIT (OUTPATIENT)
Dept: PHYSICAL THERAPY | Age: 24
End: 2024-04-15
Payer: COMMERCIAL

## 2024-04-15 ENCOUNTER — TELEPHONE (OUTPATIENT)
Age: 24
End: 2024-04-15

## 2024-04-15 DIAGNOSIS — M76.821 POSTERIOR TIBIALIS TENDINITIS OF BOTH LOWER EXTREMITIES: ICD-10-CM

## 2024-04-15 DIAGNOSIS — M21.41 PES PLANUS OF BOTH FEET: ICD-10-CM

## 2024-04-15 DIAGNOSIS — M79.672 BILATERAL LEG AND FOOT PAIN: Primary | ICD-10-CM

## 2024-04-15 DIAGNOSIS — M79.605 BILATERAL LEG AND FOOT PAIN: Primary | ICD-10-CM

## 2024-04-15 DIAGNOSIS — M79.604 BILATERAL LEG AND FOOT PAIN: Primary | ICD-10-CM

## 2024-04-15 DIAGNOSIS — M76.822 POSTERIOR TIBIALIS TENDINITIS OF BOTH LOWER EXTREMITIES: ICD-10-CM

## 2024-04-15 DIAGNOSIS — M21.42 PES PLANUS OF BOTH FEET: ICD-10-CM

## 2024-04-15 DIAGNOSIS — M79.671 BILATERAL LEG AND FOOT PAIN: Primary | ICD-10-CM

## 2024-04-15 PROCEDURE — 97140 MANUAL THERAPY 1/> REGIONS: CPT

## 2024-04-15 PROCEDURE — 97110 THERAPEUTIC EXERCISES: CPT

## 2024-04-15 PROCEDURE — 97112 NEUROMUSCULAR REEDUCATION: CPT

## 2024-04-15 NOTE — TELEPHONE ENCOUNTER
Patient called in to make an appointment. I advised that we need a referral before we can schedule. Please advise.

## 2024-04-15 NOTE — PROGRESS NOTES
"Daily Note     Today's date: 4/15/2024  Patient name: Nanda Bernardo  : 2000  MRN: [id number]  Referring provider: Yvan De Santiago*  Dx:   Encounter Diagnosis     ICD-10-CM    1. Bilateral leg and foot pain  M79.604     M79.605     M79.671     M79.672       2. Pes planus of both feet  M21.41     M21.42       3. Posterior tibialis tendinitis of both lower extremities  M76.821     M76.822                        Subjective: Patient states feeling very sore today due to starting training for her [employer] job plus her other job last night as well.       Objective: See treatment diary below      Assessment: Tolerated treatment well. Patient demonstrated fatigue post treatment. Some difficulty performing R foot inversion with band, foot was feeling more formed into plantar flexion. R Foot dorsiflexion was measured to actively to about 20 degrees.       Plan: Continue per plan of care. . Continue to improve strength.      Precautions: see chart    Visit/Unit Tracking  AUTH Status:  Date 4/2 4/4 4/9 4/11 4/15          [employer]/Med[employer] Used 1 2 3 4 5           Remaining   23 22 21 20                                Manuals 4/2 4/4 4/9 4/11 4/15           8'  Iastm  ROM  mobs 8'  AROM all planes GF x 10 min R                                               Neuro Re-Ed             Single leg,  No shoe,  Airex  Stride  SLS/  Throw gmb  2x20 ea Tandm  Airex  HT w  numbr Tandem  Airex HT w numbr 2 x 20   Foam         Tandem-DB pass  GMB  3x10  Fw/bw 3x10 3x10 Green ball   3 x 10         Lateral walk-  [place]  10#  8x ea nv 10#  8x ea 10# x 8                                                             Ther Ex             Band ankle inv Gtb  3x15 3x12  gtb nv 3x12 gtb GTB 3 x 12        Toe yoga  HEP           Short foot iso  HEP  5\" x20         Tib post heel raise Ball   Bw   Heels  3x  10-15 3x10 3x10  gmb 3x10 Green ball Green ball 3 x 10         Bent knee heel raise  nv Lunge  stance  2x10 ea " Lunge stance 2x10 ea Lunge stance 2 x 10         Bike/TM  10'  5%   2.5 mph 10'  3%  2.5  mph TM10'3% 2.5 mph TM x 10 min 3%  2.5        Great toe flexion-band  3x10  gtb Gtb  3x10                                                              Mt climber at wall   nv                                    Ther Activity             Gait analysis:  Runing             A skip  3 lap  20' nv          B skip  3 lap  20' nv          Squat  NV, Cue ankle position nv                                    Gait Training                                       Modalities

## 2024-04-18 ENCOUNTER — OFFICE VISIT (OUTPATIENT)
Dept: PHYSICAL THERAPY | Age: 24
End: 2024-04-18
Payer: COMMERCIAL

## 2024-04-18 DIAGNOSIS — M79.604 BILATERAL LEG AND FOOT PAIN: Primary | ICD-10-CM

## 2024-04-18 DIAGNOSIS — M21.42 PES PLANUS OF BOTH FEET: ICD-10-CM

## 2024-04-18 DIAGNOSIS — M21.41 PES PLANUS OF BOTH FEET: ICD-10-CM

## 2024-04-18 DIAGNOSIS — M76.822 POSTERIOR TIBIALIS TENDINITIS OF BOTH LOWER EXTREMITIES: ICD-10-CM

## 2024-04-18 DIAGNOSIS — M79.672 BILATERAL LEG AND FOOT PAIN: Primary | ICD-10-CM

## 2024-04-18 DIAGNOSIS — M79.605 BILATERAL LEG AND FOOT PAIN: Primary | ICD-10-CM

## 2024-04-18 DIAGNOSIS — M79.671 BILATERAL LEG AND FOOT PAIN: Primary | ICD-10-CM

## 2024-04-18 DIAGNOSIS — M76.821 POSTERIOR TIBIALIS TENDINITIS OF BOTH LOWER EXTREMITIES: ICD-10-CM

## 2024-04-18 PROCEDURE — 97110 THERAPEUTIC EXERCISES: CPT

## 2024-04-18 PROCEDURE — 97112 NEUROMUSCULAR REEDUCATION: CPT

## 2024-04-18 NOTE — PROGRESS NOTES
"Daily Note     Today's date: 2024  Patient name: Nanda Bernardo  : 2000  MRN: [id number]  Referring provider: Yvan De Santiago*  Dx:   Encounter Diagnosis     ICD-10-CM    1. Bilateral leg and foot pain  M79.604     M79.605     M79.671     M79.672       2. Pes planus of both feet  M21.41     M21.42       3. Posterior tibialis tendinitis of both lower extremities  M76.821     M76.822               Start Time: 1100  Stop Time: 1145  Total time in clinic (min): 45 minutes    Subjective: Pt states she does not enjoy her new job.  Reports pain is better when not wearing her orthotics.     Objective: See treatment diary below      Assessment: Pt deficits of stability noted throughout strengthening program.  She was challenged but performed them well.  Pt cued maximally to reduce avoiding plantarflexors and functional weightbearing positions.    Tolerated treatment well. Patient demonstrated fatigue post treatment.     Plan: Continue per plan of care. . Continue to improve strength.      Precautions: see chart    Visit/Unit Tracking  AUTH Status:  Date 4/2 4/4 4/9 4/11 4/15 4/18         [employer]/Med[employer] Used 1 2 3 4 5 6          Remaining   23 22 21 20                                Manuals 4/2 4/4 4/9 4/11 4/15 4/18          8'  Iastm  ROM  mobs 8'  AROM all planes GF x 10 min R                                               Neuro Re-Ed             Single leg,  No shoe,  Airex  Stride  SLS/  Throw gmb  2x20 ea Tandm  Airex  HT w  numbr Tandem  Airex HT w numbr 2 x 20   Foam  2x20       Tandem-DB pass  GMB  3x10  Fw/bw 3x10 3x10 Green ball   3 x 10         Lateral walk-  [place]  10#  8x ea nv 10#  8x ea 10# x 8  10#x8       Toe walk      10#  20'  4 lap                                              Ther Ex             Band ankle inv Gtb  3x15 3x12  gtb nv 3x12 gtb GTB 3 x 12 Gtb  3x12       Toe yoga  HEP           Short foot iso  HEP  5\" x20         Tib post heel raise Ball   Bw "   Heels  3x  10-15 3x10 3x10  gmb 3x10 Green ball Green ball 3 x 10  3x10  Grn mb       Bent knee heel raise  nv Lunge  stance  2x10 ea Lunge stance 2x10 ea Lunge stance 2 x 10  10#  3x10       Bike/TM  10'  5%   2.5 mph 10'  3%  2.5  mph TM10'3% 2.5 mph TM x 10 min 3%  2.5 TM  10'  7%  2 mph       Great toe flexion-band  3x10  gtb Gtb  3x10   Gtb  3x10                                                           Mt climber at wall   nv   nv                                 Ther Activity             Gait analysis:  Runing             A skip  3 lap  20' nv          B skip  3 lap  20' nv          Squat  NV, Cue ankle position nv                                    Gait Training                                       Modalities

## 2024-04-22 ENCOUNTER — OFFICE VISIT (OUTPATIENT)
Dept: PHYSICAL THERAPY | Age: 24
End: 2024-04-22
Payer: COMMERCIAL

## 2024-04-22 DIAGNOSIS — M76.822 POSTERIOR TIBIALIS TENDINITIS OF BOTH LOWER EXTREMITIES: ICD-10-CM

## 2024-04-22 DIAGNOSIS — M79.604 BILATERAL LEG AND FOOT PAIN: Primary | ICD-10-CM

## 2024-04-22 DIAGNOSIS — M79.605 BILATERAL LEG AND FOOT PAIN: Primary | ICD-10-CM

## 2024-04-22 DIAGNOSIS — M76.821 POSTERIOR TIBIALIS TENDINITIS OF BOTH LOWER EXTREMITIES: ICD-10-CM

## 2024-04-22 DIAGNOSIS — M21.41 PES PLANUS OF BOTH FEET: ICD-10-CM

## 2024-04-22 DIAGNOSIS — M79.671 BILATERAL LEG AND FOOT PAIN: Primary | ICD-10-CM

## 2024-04-22 DIAGNOSIS — M79.672 BILATERAL LEG AND FOOT PAIN: Primary | ICD-10-CM

## 2024-04-22 DIAGNOSIS — M21.42 PES PLANUS OF BOTH FEET: ICD-10-CM

## 2024-04-22 PROCEDURE — 97112 NEUROMUSCULAR REEDUCATION: CPT

## 2024-04-22 PROCEDURE — 97110 THERAPEUTIC EXERCISES: CPT

## 2024-04-22 NOTE — PROGRESS NOTES
"Daily Note     Today's date: 2024  Patient name: Nanda Bernardo  : 2000  MRN: [id number]  Referring provider: Yvan De Santiago*  Dx:   Encounter Diagnosis     ICD-10-CM    1. Bilateral leg and foot pain  M79.604     M79.605     M79.671     M79.672       2. Pes planus of both feet  M21.41     M21.42       3. Posterior tibialis tendinitis of both lower extremities  M76.821     M76.822                          Subjective: Pt states that she is feeling sore on the bottoms of both feet. She has been wearing her orthotics off and on.     Objective: See treatment diary below      Assessment: Pt continues to be challenged with eccentric control with TB inv. Stability is improving overall with continued focus on that today. Added in wall mountain climbers.     Tolerated treatment well. Patient demonstrated fatigue post treatment.     Plan: Continue per plan of care. . Continue to improve strength.      Precautions: see chart    Visit/Unit Tracking  AUTH Status:  Date 4/2 4/4 4/9 4/11 4/15 4/18 4/22        [employer]/Med[employer] Used 1 2 3 4 5 6 7         Remaining   23 22 21 20 19 18                              Manuals 4/2 4/4 4/9 4/11 4/15 4/18 4/22         8'  Iastm  ROM  mobs 8'  AROM all planes GF x 10 min R                                               Neuro Re-Ed             Single leg,  No shoe,  Airex  Stride  SLS/  Throw gmb  2x20 ea Tandm  Airex  HT w  numbr Tandem  Airex HT w numbr 2 x 20   Foam  2x20 2x20\"      Tandem-DB pass  GMB  3x10  Fw/bw 3x10 3x10 Green ball   3 x 10   Green ball 3x10      Lateral walk-  [place]  10#  8x ea nv 10#  8x ea 10# x 8  10#x8 10#x8      Toe walk      10#  20'  4 lap 10#  20'  4 lap                                             Ther Ex             Band ankle inv Gtb  3x15 3x12  gtb nv 3x12 gtb GTB 3 x 12 Gtb  3x12 Gtb  3x12      Toe yoga  HEP           Short foot iso  HEP  5\" x20         Tib post heel raise Ball   Bw   Heels  3x  10-15 3x10 3x10  gmb " 3x10 Green ball Green ball 3 x 10  3x10  Grn mb 3x10  Grn mb      Bent knee heel raise  nv Lunge  stance  2x10 ea Lunge stance 2x10 ea Lunge stance 2 x 10  10#  3x10 10# 3x10      Bike/TM  10'  5%   2.5 mph 10'  3%  2.5  mph TM10'3% 2.5 mph TM x 10 min 3%  2.5 TM  10'  7%  2 mph TM  10'  7%  3.3 mph      Great toe flexion-band  3x10  gtb Gtb  3x10   Gtb  3x10 Gtb 3x10                                                          Mt climber at wall   nv   nv 3x10 ea                                Ther Activity             Gait analysis:  Runing             A skip  3 lap  20' nv          B skip  3 lap  20' nv          Squat  NV, Cue ankle position nv                                    Gait Training                                       Modalities

## 2024-04-25 ENCOUNTER — OFFICE VISIT (OUTPATIENT)
Dept: PHYSICAL THERAPY | Age: 24
End: 2024-04-25
Payer: COMMERCIAL

## 2024-04-25 DIAGNOSIS — M79.605 BILATERAL LEG AND FOOT PAIN: Primary | ICD-10-CM

## 2024-04-25 DIAGNOSIS — M79.672 BILATERAL LEG AND FOOT PAIN: Primary | ICD-10-CM

## 2024-04-25 DIAGNOSIS — M21.41 PES PLANUS OF BOTH FEET: ICD-10-CM

## 2024-04-25 DIAGNOSIS — M79.671 BILATERAL LEG AND FOOT PAIN: Primary | ICD-10-CM

## 2024-04-25 DIAGNOSIS — M79.604 BILATERAL LEG AND FOOT PAIN: Primary | ICD-10-CM

## 2024-04-25 DIAGNOSIS — M76.821 POSTERIOR TIBIALIS TENDINITIS OF BOTH LOWER EXTREMITIES: ICD-10-CM

## 2024-04-25 DIAGNOSIS — M76.822 POSTERIOR TIBIALIS TENDINITIS OF BOTH LOWER EXTREMITIES: ICD-10-CM

## 2024-04-25 DIAGNOSIS — M21.42 PES PLANUS OF BOTH FEET: ICD-10-CM

## 2024-04-25 PROCEDURE — 97112 NEUROMUSCULAR REEDUCATION: CPT

## 2024-04-25 PROCEDURE — 97110 THERAPEUTIC EXERCISES: CPT

## 2024-04-25 NOTE — PROGRESS NOTES
"Daily Note     Today's date: 2024  Patient name: Nanda Bernardo  : 2000  MRN: [id number]  Referring provider: Yvan De Santiago*  Dx:   Encounter Diagnosis     ICD-10-CM    1. Bilateral leg and foot pain  M79.604     M79.605     M79.671     M79.672       2. Pes planus of both feet  M21.41     M21.42       3. Posterior tibialis tendinitis of both lower extremities  M76.821     M76.822                 Start Time: 852  Stop Time: 930  Total time in clinic (min): 38 minutes    Subjective: Pt reports her feet are very sore at the end of the day from work    Objective: See treatment diary below      Assessment: Pt stability challenged with program today. Pt session limited by late arrival.  Pt reports she will start a 5k run in a week or so without training.    Tolerated treatment well. Patient demonstrated fatigue post treatment.     Plan: Continue per plan of care. . Continue to improve strength.      Precautions: see chart    Visit/Unit Tracking  AUTH Status:  Date 4/2 4/4 4/9 4/11 4/15 4/18 4/22 4/25       [employer]/Med[employer] Used 1 2 3 4 5 6 7 8        Remaining   23 22 21 20 19 18 17                             Manuals 4/2 4/4 4/9 4/11 4/15 4/18 4/22 4/25        8'  Iastm  ROM  mobs 8'  AROM all planes GF x 10 min R                                               Neuro Re-Ed             Single leg,  No shoe,  Airex  Stride  SLS/  Throw gmb  2x20 ea Tandm  Airex  HT w  numbr Tandem  Airex HT w numbr 2 x 20   Foam  2x20 2x20\"      Tandem-DB pass  GMB  3x10  Fw/bw 3x10 3x10 Green ball   3 x 10   Green ball 3x10      Lateral walk-  [place]  10#  8x ea nv 10#  8x ea 10# x 8  10#x8 10#x8      Toe walk      10#  20'  4 lap 10#  20'  4 lap 10#    3 lap     Heel walk         10#    3 lap                                            Ther Ex             Band ankle inv Gtb  3x15 3x12  gtb nv 3x12 gtb GTB 3 x 12 Gtb  3x12 Gtb  3x12      Toe yoga  HEP           Short foot iso  HEP  5\" x20       "   Tib post heel raise Ball   Bw   Heels  3x  10-15 3x10 3x10  gmb 3x10 Green ball Green ball 3 x 10  3x10  Grn mb 3x10  Grn mb 3x10  10#     Bent knee heel raise  nv Lunge  stance  2x10 ea Lunge stance 2x10 ea Lunge stance 2 x 10  10#  3x10 10# 3x10 10#    3x10     Bike/TM  10'  5%   2.5 mph 10'  3%  2.5  mph TM10'3% 2.5 mph TM x 10 min 3%  2.5 TM  10'  7%  2 mph TM  10'  7%  3.3 mph Bike  6'  L4       Great toe flexion-band  3x10  gtb Gtb  3x10   Gtb  3x10 Gtb 3x10 nv                                                         Mt climber at wall   nv   nv 3x10 ea nv                               Ther Activity             Gait analysis:  Runing             A skip  3 lap  20' nv          B skip  3 lap  20' nv          Squat  NV, Cue ankle position nv                                    Gait Training                                       Modalities

## 2024-04-29 ENCOUNTER — OFFICE VISIT (OUTPATIENT)
Dept: PHYSICAL THERAPY | Age: 24
End: 2024-04-29
Payer: COMMERCIAL

## 2024-04-29 DIAGNOSIS — M79.671 BILATERAL LEG AND FOOT PAIN: Primary | ICD-10-CM

## 2024-04-29 DIAGNOSIS — M79.605 BILATERAL LEG AND FOOT PAIN: Primary | ICD-10-CM

## 2024-04-29 DIAGNOSIS — M21.42 PES PLANUS OF BOTH FEET: ICD-10-CM

## 2024-04-29 DIAGNOSIS — M21.41 PES PLANUS OF BOTH FEET: ICD-10-CM

## 2024-04-29 DIAGNOSIS — M79.604 BILATERAL LEG AND FOOT PAIN: Primary | ICD-10-CM

## 2024-04-29 DIAGNOSIS — M76.821 POSTERIOR TIBIALIS TENDINITIS OF BOTH LOWER EXTREMITIES: ICD-10-CM

## 2024-04-29 DIAGNOSIS — M76.822 POSTERIOR TIBIALIS TENDINITIS OF BOTH LOWER EXTREMITIES: ICD-10-CM

## 2024-04-29 DIAGNOSIS — M79.672 BILATERAL LEG AND FOOT PAIN: Primary | ICD-10-CM

## 2024-04-29 PROCEDURE — 97110 THERAPEUTIC EXERCISES: CPT

## 2024-04-29 PROCEDURE — 97112 NEUROMUSCULAR REEDUCATION: CPT

## 2024-04-29 NOTE — PROGRESS NOTES
"Daily Note     Today's date: 2024  Patient name: Nanda Bernardo  : 2000  MRN: [id number]  Referring provider: Yvan De Santiago*  Dx:   Encounter Diagnosis     ICD-10-CM    1. Bilateral leg and foot pain  M79.604     M79.605     M79.671     M79.672       2. Pes planus of both feet  M21.41     M21.42       3. Posterior tibialis tendinitis of both lower extremities  M76.821     M76.822                      Subjective: Patient reports she is being very sore today.       Objective: See treatment diary below      Assessment: Pt reports no increased pain during or post tx session.       Plan: Continue per plan of care.      Precautions: see chart    Visit/Unit Tracking  AUTH Status:  Date 4/2 4/4 4/9 4/11 4/15 4/18 4/22 4/25 4/29      [employer] Used 1 2 3 4 5 6 7 8 9       Remaining   23 22 21 20 19 18 17 16                            Manuals 4/2 4/4 4/9 4/11 4/15 4/18 4/22 4/25 4/29       8'  Iastm  ROM  mobs 8'  AROM all planes GF x 10 min R                                               Neuro Re-Ed             Single leg,  No shoe,  Airex  Stride  SLS/  Throw gmb  2x20 ea Tandm  Airex  HT w  numbr Tandem  Airex HT w numbr 2 x 20   Foam  2x20 2x20\"      Tandem-DB pass  GMB  3x10  Fw/bw 3x10 3x10 Green ball   3 x 10   Green ball 3x10      Lateral walk-  [place]  10#  8x ea nv 10#  8x ea 10# x 8  10#x8 10#x8  10# x8     Toe walk      10#  20'  4 lap 10#  20'  4 lap 10#    3 lap 10# 3 lap    Heel walk         10#    3 lap 10# 3 lap                                           Ther Ex             Band ankle inv Gtb  3x15 3x12  gtb nv 3x12 gtb GTB 3 x 12 Gtb  3x12 Gtb  3x12      Toe yoga  HEP           Short foot iso  HEP  5\" x20         Tib post heel raise Ball   Bw   Heels  3x  10-15 3x10 3x10  gmb 3x10 Green ball Green ball 3 x 10  3x10  Grn mb 3x10  Grn mb 3x10  10# 3x10 10#    Bent knee heel raise  nv Lunge  stance  2x10 ea Lunge stance 2x10 ea Lunge stance 2 x 10  10#  3x10 10# " 3x10 10#    3x10 3x10 10#    Bike/TM  10'  5%   2.5 mph 10'  3%  2.5  mph TM10'3% 2.5 mph TM x 10 min 3%  2.5 TM  10'  7%  2 mph TM  10'  7%  3.3 mph Bike  6'  L4   TM 3.5% 3.5 mph    Great toe flexion-band  3x10  gtb Gtb  3x10   Gtb  3x10 Gtb 3x10 nv                                                         Mt climber at wall   nv   nv 3x10 ea nv                               Ther Activity             Gait analysis:  Runing             A skip  3 lap  20' nv          B skip  3 lap  20' nv          Squat  NV, Cue ankle position nv                                    Gait Training                                       Modalities

## 2024-05-02 ENCOUNTER — OFFICE VISIT (OUTPATIENT)
Dept: PHYSICAL THERAPY | Age: 24
End: 2024-05-02
Payer: COMMERCIAL

## 2024-05-02 DIAGNOSIS — M79.671 BILATERAL LEG AND FOOT PAIN: Primary | ICD-10-CM

## 2024-05-02 DIAGNOSIS — M21.42 PES PLANUS OF BOTH FEET: ICD-10-CM

## 2024-05-02 DIAGNOSIS — M79.672 BILATERAL LEG AND FOOT PAIN: Primary | ICD-10-CM

## 2024-05-02 DIAGNOSIS — M79.604 BILATERAL LEG AND FOOT PAIN: Primary | ICD-10-CM

## 2024-05-02 DIAGNOSIS — M21.41 PES PLANUS OF BOTH FEET: ICD-10-CM

## 2024-05-02 DIAGNOSIS — M79.605 BILATERAL LEG AND FOOT PAIN: Primary | ICD-10-CM

## 2024-05-02 DIAGNOSIS — M76.822 POSTERIOR TIBIALIS TENDINITIS OF BOTH LOWER EXTREMITIES: ICD-10-CM

## 2024-05-02 DIAGNOSIS — M76.821 POSTERIOR TIBIALIS TENDINITIS OF BOTH LOWER EXTREMITIES: ICD-10-CM

## 2024-05-02 PROCEDURE — 97112 NEUROMUSCULAR REEDUCATION: CPT

## 2024-05-02 NOTE — PROGRESS NOTES
PT Re-Evaluation     Today's date: 2024  Patient name: Nanda Bernardo  : 2000  MRN: [id number]  Referring provider: Yvan De Santiago*  Dx:   Encounter Diagnosis     ICD-10-CM    1. Bilateral leg and foot pain  M79.604     M79.605     M79.671     M79.672       2. Pes planus of both feet  M21.41     M21.42       3. Posterior tibialis tendinitis of both lower extremities  M76.821     M76.822           Start Time: 930  Stop Time: 1015  Total time in clinic (min): 45 minutes    Assessment  Assessment details: Pt is a 22 y/o F who presents with bilateral foot and ankle pain. Pt demonstrates decreased proprioception with static balance, decreased strength in ankles, decreased walking tolerance and inability to participate in functional activities like running regularly.     Pt has ankle instability bilaterally and decreased strength which limits her overall activity participation and ADLs at work and at home.  She demonstrates improvements since her initial evaluation in areas of gross strength and in endurance/stability in ankle.  Pain reduced overall.     Pt has a positive prognosis. Pt would benefit from PT to address these impairments leading to increased functional capacity and improved quality of life.  Training should include return to running and include cross training to build endurance and resiliency.   Impairments: abnormal or restricted ROM, impaired physical strength, lacks appropriate home exercise program, pain with function, poor posture  and poor body mechanics  Understanding of Dx/Px/POC: good   Prognosis: good    Goals  All goals improving    In 2-4 weeks:  Pt will improve pain on VAPS by 1-2 points indicating improved function  Pt will demonstrate independence and understanding of HEP  Pt will demonstrate improved navigation up and down stairs independently     In 6-8 weeks:  Pt will demonstrate improved ROM >110 adequate for ascending/descending stairs  Pt will demonstrate SL  "heel raise test within 10% of affected side  Pt will demonstrate squat with normal mechanics  Pt will demonstrate symmetrical SLS and SLS with EC indicating improved proprioception  Pt will tolerate 30 minutes of running with improved pain and mechanics      Plan  Patient would benefit from: skilled physical therapy  Planned modality interventions: cryotherapy and thermotherapy: hydrocollator packs  Planned therapy interventions: neuromuscular re-education, patient education, stretching, strengthening, therapeutic activities, therapeutic exercise, therapeutic training, home exercise program and graded activity  Frequency: 2x week (Twice a week for weeks)  Duration in weeks: 4  Treatment plan discussed with: patient        Subjective Evaluation    History of Present Illness  Mechanism of injury: PT RE 24: Patient reports she has no pain today.  She notes that she did not work recently and feels this is partly why. She feels a little relief of pain in both legs.  She notes she will be running a 5k without any training.  I advised her that this may increase her foot pain.     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  Previous [employer] work 2 yrs ago  Reports bilateral feet numbness occasionally  Pt is a runner--runs about a half hour-started back in February-took a month or so break  2-4 runs per week, 30 mins, about 2 mi      Pain  Current pain ratin  At best pain ratin  At worst pain ratin          Objective     Neurological Testing     Sensation     Ankle/Foot   Left Ankle/Foot   Intact: light touch    Right Ankle/Foot   Intact: light touch     Active Range of Motion   Left Ankle/Foot   Normal active range of motion  Dorsiflexion (ke): 15 degrees   Inversion: 40 degrees     Right Ankle/Foot " "  Normal active range of motion  Dorsiflexion (ke): 15 degrees   Inversion: 40 degrees     Additional Active Range of Motion Details  hypermobile    Joint Play   Left Ankle/Foot  Hypermobile in the talocrural joint, subtalar joint, midfoot and forefoot.     Right Ankle/Foot  Hypermobile in the talocrural joint, subtalar joint, midfoot and forefoot.      Strength/Myotome Testing     Left Ankle/Foot   Dorsiflexion: 4+  Plantar flexion: 4+  Inversion: 4+  Eversion: 4+    Right Ankle/Foot   Dorsiflexion: 4+  Plantar flexion: 4+  Inversion: 4+  Eversion: 4+    Additional Strength Details  Grossly improved strength    Functional Assessment      Squat    Left within functional limits, right within functional limits, left valgus and right valgus.     Single Leg Squat   Left Leg  Unable to perform.     Single Leg Stance - Eyes Open   Left  Trial 1: 5 seconds    Right  Trial 1: 5 seconds    Comments  HEEL RAISE:   -bilateral x 20 able  -single leg x 5 with instability noted, decreased control    General Comments:      Ankle/Foot Comments   Balance: 30\" on single leg-normal bilaterally  10\" on airex foam: loss of balance bilaterally    SL heel raise: x 10 on each side                   Precautions: see chart     Visit/Unit Tracking  AUTH Status:  Date 4/2 4/4 4/9 4/11 4/15 4/18 4/22 4/25 4/29 5/2       [employer]/Medicaid Used 1 2 3 4 5 6 7 8 9 10         Remaining    23 22 21 20 19 18 17 16 17            1/10                  1/10                Manuals 4/2 4/4 4/9 4/11 4/15 4/18 4/22 4/25 4/29  5/2         8'  Iastm  ROM  mobs 8'  AROM all planes GF x 10 min R                                                                                     Neuro Re-Ed                       Single leg,  No shoe,  Airex   Stride  SLS/  Throw gmb  2x20 ea Tandm  Airex  HT w  numbr Tandem  Airex HT w numbr 2 x 20   Foam  2x20 2x20\"      nv   Tandem-DB pass   GMB  3x10  Fw/bw 3x10 3x10 Green ball   3 x 10    Green ball 3x10       " "  Lateral walk-  [place]   10#  8x ea nv 10#  8x ea 10# x 8  10#x8 10#x8   10# x8   11#  2x8   Toe walk           10#  20'  4 lap 10#  20'  4 lap 10#     3 lap 10# 3 lap     Heel walk                10#     3 lap 10# 3 lap     Single leg land     From box                   8\" height     3 x 6   Grapevines  (Speed walking)                   X5 lap   B skip (shuffling)                   X5 lap                           Ther Ex                       Band ankle inv Gtb  3x15 3x12  gtb nv 3x12 gtb GTB 3 x 12 Gtb  3x12 Gtb  3x12         Toe yoga   HEP                   Short foot iso   HEP   5\" x20               Tib post heel raise Ball   Bw   Heels  3x  10-15 3x10 3x10  gmb 3x10 Green ball Green ball 3 x 10  3x10  Grn mb 3x10  Grn mb 3x10  10# 3x10 10#     Bent knee heel raise   nv Lunge  stance  2x10 ea Lunge stance 2x10 ea Lunge stance 2 x 10  10#  3x10 10# 3x10 10#     3x10 3x10 10# Incline  10#     3x10     Bony.    Bike/TM   10'  5%   2.5 mph 10'  3%  2.5  mph TM10'3% 2.5 mph TM x 10 min 3%  2.5 TM  10'  7%  2 mph TM  10'  7%  3.3 mph Bike  6'  L4    TM 3.5% 3.5 mph  10'  3.5%    3.5 mph   Great toe flexion-band   3x10  gtb Gtb  3x10     Gtb  3x10 Gtb 3x10 nv                                                                                                       Mt climber at wall     nv     nv 3x10 ea nv                                                       Ther Activity                       Gait analysis:  Runing                       A skip   3 lap  20' nv                 B skip   3 lap  20' nv                 Squat   NV, Cue ankle position nv                                                                 Gait Training                                                                       Modalities                                                                                 "

## 2024-05-02 NOTE — PROGRESS NOTES
"Daily Note     Today's date: 2024  Patient name: Nanda Bernardo  : 2000  MRN: [id number]  Referring provider: Yvan De Santiago*  Dx:   Encounter Diagnosis     ICD-10-CM    1. Bilateral leg and foot pain  M79.604     M79.605     M79.671     M79.672       2. Pes planus of both feet  M21.41     M21.42       3. Posterior tibialis tendinitis of both lower extremities  M76.821     M76.822             Start Time: 930  Stop Time: 1015  Total time in clinic (min): 45 minutes    Subjective: Patient reports she has no pain today.  She notes that she did not work recently and feels this is partly why. She feels a little relief of pain in both legs        Objective: See treatment diary below      Assessment: Pt reports no increased pain during or post tx session.       Plan: Continue per plan of care.      Precautions: see chart    Visit/Unit Tracking  AUTH Status:  Date 4/2 4/4 4/9 4/11 4/15 4/18 4/22 4/25 4/29 5/2     [employer]/Medicaid Used 1 2 3 4 5 6 7 8 9 10      Remaining   23 22 21 20 19 18 17 16 17                           Manuals 4/2 4/4 4/9 4/11 4/15 4/18 4/22 4/25 4/29       8'  Iastm  ROM  mobs 8'  AROM all planes GF x 10 min R                                               Neuro Re-Ed             Single leg,  No shoe,  Airex  Stride  SLS/  Throw gmb  2x20 ea Tandm  Airex  HT w  numbr Tandem  Airex HT w numbr 2 x 20   Foam  2x20 2x20\"      Tandem-DB pass  GMB  3x10  Fw/bw 3x10 3x10 Green ball   3 x 10   Green ball 3x10      Lateral walk-  [place]  10#  8x ea nv 10#  8x ea 10# x 8  10#x8 10#x8  10# x8     Toe walk      10#  20'  4 lap 10#  20'  4 lap 10#    3 lap 10# 3 lap    Heel walk         10#    3 lap 10# 3 lap    Single leg land    From box          8\" height    3 x 6   Grapevines  (Speed walking)          X5 lap   B skip (shuffling)          X5 lap                Ther Ex             Band ankle inv Gtb  3x15 3x12  gtb nv 3x12 gtb GTB 3 x 12 Gtb  3x12 Gtb  3x12      Toe yoga  " "HEP           Short foot iso  HEP  5\" x20         Tib post heel raise Ball   Bw   Heels  3x  10-15 3x10 3x10  gmb 3x10 Green ball Green ball 3 x 10  3x10  Grn mb 3x10  Grn mb 3x10  10# 3x10 10#    Bent knee heel raise  nv Lunge  stance  2x10 ea Lunge stance 2x10 ea Lunge stance 2 x 10  10#  3x10 10# 3x10 10#    3x10 3x10 10# Incline  10#    3x10    Bony.    Bike/TM  10'  5%   2.5 mph 10'  3%  2.5  mph TM10'3% 2.5 mph TM x 10 min 3%  2.5 TM  10'  7%  2 mph TM  10'  7%  3.3 mph Bike  6'  L4   TM 3.5% 3.5 mph    Great toe flexion-band  3x10  gtb Gtb  3x10   Gtb  3x10 Gtb 3x10 nv                                                         Mt climber at wall   nv   nv 3x10 ea nv                               Ther Activity             Gait analysis:  Runing             A skip  3 lap  20' nv          B skip  3 lap  20' nv          Squat  NV, Cue ankle position nv                                    Gait Training                                       Modalities                                                "

## 2024-05-06 ENCOUNTER — OFFICE VISIT (OUTPATIENT)
Dept: PHYSICAL THERAPY | Age: 24
End: 2024-05-06
Payer: COMMERCIAL

## 2024-05-06 DIAGNOSIS — M79.672 BILATERAL LEG AND FOOT PAIN: Primary | ICD-10-CM

## 2024-05-06 DIAGNOSIS — M79.671 BILATERAL LEG AND FOOT PAIN: Primary | ICD-10-CM

## 2024-05-06 DIAGNOSIS — M21.42 PES PLANUS OF BOTH FEET: ICD-10-CM

## 2024-05-06 DIAGNOSIS — M76.822 POSTERIOR TIBIALIS TENDINITIS OF BOTH LOWER EXTREMITIES: ICD-10-CM

## 2024-05-06 DIAGNOSIS — M76.821 POSTERIOR TIBIALIS TENDINITIS OF BOTH LOWER EXTREMITIES: ICD-10-CM

## 2024-05-06 DIAGNOSIS — M79.605 BILATERAL LEG AND FOOT PAIN: Primary | ICD-10-CM

## 2024-05-06 DIAGNOSIS — M21.41 PES PLANUS OF BOTH FEET: ICD-10-CM

## 2024-05-06 DIAGNOSIS — M79.604 BILATERAL LEG AND FOOT PAIN: Primary | ICD-10-CM

## 2024-05-06 PROCEDURE — 97110 THERAPEUTIC EXERCISES: CPT

## 2024-05-06 PROCEDURE — 97112 NEUROMUSCULAR REEDUCATION: CPT

## 2024-05-06 NOTE — PROGRESS NOTES
"Daily Note     Today's date: 2024  Patient name: Nanda Bernardo  : 2000  MRN: [id number]  Referring provider: Yvan De Santiago*  Dx:   Encounter Diagnosis     ICD-10-CM    1. Bilateral leg and foot pain  M79.604     M79.605     M79.671     M79.672       2. Pes planus of both feet  M21.41     M21.42       3. Posterior tibialis tendinitis of both lower extremities  M76.821     M76.822                      Subjective: pt reports running in race this weekend and tripped and fell, bruising  R knee, pt reports R knee is sore but feels ok      Objective: See treatment diary below      Assessment: pt able to sukhjinder ex despite subjective reports, pt had some difficulty with controlling single leg landing from 8\" step      Plan: Continue per plan of care.  Progress treatment as tolerated.       Precautions: see chart     Visit/Unit Tracking  AUTH Status:  Date 4/2 4/4 4/9 4/11 4/15 4/18 4/22 4/25 4/29 5/2  5/6     [employer]/Medicaid Used 1 2 3 4 5 6 7 8 9 10  11       Remaining    23 22 21 20 19 18 17 16 17  18          1/10                  1/10                Manuals 4/9 4/11 4/15 4/18 4/22 4/25 4/29  5/2      8'  Iastm  ROM  mobs 8'  AROM all planes GF x 10 min R                                                                             Neuro Re-Ed                    Single leg,  No shoe,  Airex Tandm  Airex  HT w  numbr Tandem  Airex HT w numbr 2 x 20   Foam  2x20 2x20\"      nv Airex 3x30\" ea   Tandem-DB pass 3x10 3x10 Green ball   3 x 10    Green ball 3x10          Lateral walk-  [employer] nv 10#  8x ea 10# x 8  10#x8 10#x8   10# x8   11#  2x8 11# 2x10 ea   Toe walk       10#  20'  4 lap 10#  20'  4 lap 10#     3 lap 10# 3 lap  10# 3 laps   Heel walk            10#     3 lap 10# 3 lap  10# 3 laps   Single leg land     From box               8\" height     3 x 6 8\" 3x6    Grapevines  (Speed walking)               X5 lap 5x   B skip (shuffling)               X5 lap 5x                      " "  Ther Ex                 5/6   Band ankle inv nv 3x12 gtb GTB 3 x 12 Gtb  3x12 Gtb  3x12          Toe yoga                    Short foot iso   5\" x20                Tib post heel raise 3x10  gmb 3x10 Green ball Green ball 3 x 10  3x10  Grn mb 3x10  Grn mb 3x10  10# 3x10 10#      Bent knee heel raise Lunge  stance  2x10 ea Lunge stance 2x10 ea Lunge stance 2 x 10  10#  3x10 10# 3x10 10#     3x10 3x10 10# Incline  10#     3x10     Bony.  Incline 10# 3x10 B/L   Bike/TM 10'  3%  2.5  mph TM10'3% 2.5 mph TM x 10 min 3%  2.5 TM  10'  7%  2 mph TM  10'  7%  3.3 mph Bike  6'  L4    TM 3.5% 3.5 mph  10'  3.5%    3.5 mph 10' 4% 3.6 mph   Great toe flexion-band Gtb  3x10     Gtb  3x10 Gtb 3x10 nv                                                                                            Mt climber at wall nv     nv 3x10 ea nv                                                  Ther Activity                    Gait analysis:  Runing                    A skip nv                  B skip nv                  Squat nv                                                            Gait Training                                                              Modalities                                                                        "

## 2024-05-09 ENCOUNTER — EVALUATION (OUTPATIENT)
Dept: PHYSICAL THERAPY | Age: 24
End: 2024-05-09
Payer: COMMERCIAL

## 2024-05-09 DIAGNOSIS — M79.671 BILATERAL LEG AND FOOT PAIN: Primary | ICD-10-CM

## 2024-05-09 DIAGNOSIS — M76.821 POSTERIOR TIBIALIS TENDINITIS OF BOTH LOWER EXTREMITIES: ICD-10-CM

## 2024-05-09 DIAGNOSIS — M79.605 BILATERAL LEG AND FOOT PAIN: Primary | ICD-10-CM

## 2024-05-09 DIAGNOSIS — M21.41 PES PLANUS OF BOTH FEET: ICD-10-CM

## 2024-05-09 DIAGNOSIS — M79.604 BILATERAL LEG AND FOOT PAIN: Primary | ICD-10-CM

## 2024-05-09 DIAGNOSIS — M21.42 PES PLANUS OF BOTH FEET: ICD-10-CM

## 2024-05-09 DIAGNOSIS — M79.672 BILATERAL LEG AND FOOT PAIN: Primary | ICD-10-CM

## 2024-05-09 DIAGNOSIS — M76.822 POSTERIOR TIBIALIS TENDINITIS OF BOTH LOWER EXTREMITIES: ICD-10-CM

## 2024-05-09 PROCEDURE — 97110 THERAPEUTIC EXERCISES: CPT

## 2024-05-09 PROCEDURE — 97112 NEUROMUSCULAR REEDUCATION: CPT

## 2024-05-09 NOTE — PROGRESS NOTES
PT Evaluation     Today's date: 2024  Patient name: Nanda Bernardo  : 2000  MRN: [id number]  Referring provider: Yvan De Santiago*  Dx:   Encounter Diagnosis     ICD-10-CM    1. Bilateral leg and foot pain  M79.604     M79.605     M79.671     M79.672       2. Pes planus of both feet  M21.41     M21.42       3. Posterior tibialis tendinitis of both lower extremities  M76.821     M76.822             Start Time: 930  Stop Time: 1015  Total time in clinic (min): 45 minutes    Assessment  Assessment details: Pt is a 24 y/o F who presents with bilateral foot and ankle pain. Pt demonstrates decreased proprioception with static balance, decreased strength in ankles, decreased walking tolerance and inability to participate in functional activities like running regularly.   Pt reports improvements in function with walking tolerance . She has resumed some running.  Recently, she fell while jogging and has a bruised knee that alters her walking.  Her feet and ankles are more stable       Pt has ankle instability bilaterally and decreased strength which limits her overall activity participation and ADLs at work and at home.  Pt has a positive prognosis. Pt would benefit from PT to address these impairments leading to increased functional capacity and improved quality of life.    Impairments: abnormal or restricted ROM, impaired physical strength, lacks appropriate home exercise program, pain with function, poor posture  and poor body mechanics  Understanding of Dx/Px/POC: good   Prognosis: good    Goals  In 2-4 weeks:  Pt will improve pain on VAPS by 1-2 points indicating improved function MET  Pt will demonstrate independence and understanding of HEP ONGOING  Pt will demonstrate improved navigation up and down stairs independently MET     In 6-8 weeks:  Pt will demonstrate SL heel raise test within 10% of affected side ONGOING  Pt will demonstrate squat with normal mechanics ONGOING  Pt will  "demonstrate symmetrical SLS and SLS with EC indicating improved  proprioception ONGOING  Pt will tolerate 30 minutes of running with improved pain and mechanics ONGOING      Plan  Patient would benefit from: skilled physical therapy  Planned modality interventions: cryotherapy and thermotherapy: hydrocollator packs  Planned therapy interventions: neuromuscular re-education, patient education, stretching, strengthening, therapeutic activities, therapeutic exercise, therapeutic training, home exercise program and graded activity  Frequency: 2x week (Twice a week for weeks)  Duration in weeks: 8  Treatment plan discussed with: patient        Subjective Evaluation    History of Present Illness  Mechanism of injury: PT RE: Pt has overall improved pain in her ankles/feet. Pain less severe.  Feels about 50% better.      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  Previous [employer] work 2 yrs ago  Reports bilateral feet numbness occasionally  Pt is a runner--runs about a half hour-started back in February-took a month or so break  2-4 runs per week, 30 mins, about 2 mi      Pain  Current pain ratin  At worst pain ratin          Objective     Neurological Testing     Sensation     Ankle/Foot   Left Ankle/Foot   Intact: light touch    Right Ankle/Foot   Intact: light touch     Active Range of Motion   Left Ankle/Foot   Normal active range of motion  Dorsiflexion (ke): 15 degrees   Inversion: 40 degrees     Right Ankle/Foot   Normal active range of motion  Dorsiflexion (ke): 15 degrees   Inversion: 40 degrees     Additional Active Range of Motion Details  hypermobile    Joint Play   Left Ankle/Foot  Hypermobile in the talocrural joint, subtalar joint, midfoot and forefoot.     Right Ankle/Foot  Hypermobile in the " talocrural joint, subtalar joint, midfoot and forefoot.      Strength/Myotome Testing     Left Ankle/Foot   Dorsiflexion: 4  Plantar flexion: 4  Inversion: 4  Eversion: 4    Right Ankle/Foot   Dorsiflexion: 4  Plantar flexion: 4  Inversion: 4  Eversion: 4    Additional Strength Details  Pain in general     Functional Assessment      Squat    Left within functional limits, right within functional limits, left valgus and right valgus.     Single Leg Squat   Left Leg  Unable to perform.     Single Leg Stance - Eyes Open   Left  Trial 1: 20 seconds    Right  Trial 1: 20 seconds    Comments  HEEL RAISE:   -bilateral x 20 able  -single leg x 8 with instability noted, decreased control             Precautions: see chart    Precautions: see chart     Visit/Unit Tracking  AUTH Status:  Date 5/9               [employer]/Medicaid Used 1/10                 Remaining  12/24                                           Manuals 5/9                                                               Neuro Re-Ed            Single leg,  No shoe,  Airex            Tandem-DB pass nv           Lateral walk-  [place] 10#    3x10           Toe walk 15# x 2    x6           Heel walk             Single leg land     From box            Grapevines  (Speed walking)            B [employer] (shuffling)                         Ther Ex            Band ankle inv            Toe yoga            Short foot iso            Tib post heel raise 10#    3x10           Bent knee heel raise            Bike/TM            Great toe flexion-band            Leg Press  95#    3x10                                                 Mt climber at wall 3x15  nv                                     Ther Activity            Gait analysis:  Runing            A skip            B skip            Squat                                                      Gait Training                                                              Modalities                                                                       HOME EXERCISE PROGRAM [[id number]] HEP2Go    Short Foot -  Repeat 10 Repetitions, Hold 5 Seconds, Complete 2 Sets, Perform 2 Times a Day    Reciprocal Toe Extension -  Repeat 15 Repetitions, Hold 1 Second(s), Complete 2 Sets, Perform 2 Times a Day    Toe Splay -  Repeat 10 Repetitions, Hold 5 Seconds, Complete 2 Sets, Perform 2 Times a Day    HEEL RAISE - CALF RAISE - STANDING BALL SQUEEZE  -  Repeat 15 Repetitions, Hold 1 Second(s), Complete 3 Sets, Perform 1 Times a Day    Bent Knee Heel Raise / Soleus Heel Raise -  Repeat 15 Repetitions, Hold 1 Second(s), Complete 3 Sets, Perform 1 Times a Day

## 2024-05-13 ENCOUNTER — APPOINTMENT (OUTPATIENT)
Dept: PHYSICAL THERAPY | Age: 24
End: 2024-05-13
Payer: COMMERCIAL

## 2024-05-16 ENCOUNTER — OFFICE VISIT (OUTPATIENT)
Dept: PHYSICAL THERAPY | Age: 24
End: 2024-05-16
Payer: COMMERCIAL

## 2024-05-16 DIAGNOSIS — M79.672 BILATERAL LEG AND FOOT PAIN: Primary | ICD-10-CM

## 2024-05-16 DIAGNOSIS — M21.41 PES PLANUS OF BOTH FEET: ICD-10-CM

## 2024-05-16 DIAGNOSIS — M79.604 BILATERAL LEG AND FOOT PAIN: Primary | ICD-10-CM

## 2024-05-16 DIAGNOSIS — M76.821 POSTERIOR TIBIALIS TENDINITIS OF BOTH LOWER EXTREMITIES: ICD-10-CM

## 2024-05-16 DIAGNOSIS — M79.605 BILATERAL LEG AND FOOT PAIN: Primary | ICD-10-CM

## 2024-05-16 DIAGNOSIS — M79.671 BILATERAL LEG AND FOOT PAIN: Primary | ICD-10-CM

## 2024-05-16 DIAGNOSIS — M76.822 POSTERIOR TIBIALIS TENDINITIS OF BOTH LOWER EXTREMITIES: ICD-10-CM

## 2024-05-16 DIAGNOSIS — M21.42 PES PLANUS OF BOTH FEET: ICD-10-CM

## 2024-05-16 PROCEDURE — 97110 THERAPEUTIC EXERCISES: CPT

## 2024-05-16 PROCEDURE — 97112 NEUROMUSCULAR REEDUCATION: CPT

## 2024-05-16 NOTE — PROGRESS NOTES
"Daily Note     Today's date: 2024  Patient name: Nanda Bernardo  : 2000  MRN: [id number]  Referring provider: Yvan De Santiago*  Dx:   Encounter Diagnosis     ICD-10-CM    1. Bilateral leg and foot pain  M79.604     M79.605     M79.671     M79.672       2. Pes planus of both feet  M21.41     M21.42       3. Posterior tibialis tendinitis of both lower extremities  M76.821     M76.822           Start Time: 930  Stop Time: 1012  Total time in clinic (min): 42 minutes    Subjective: Pt reports pain after working      Objective: See treatment diary below      Assessment: Emphasized control with landing mechanics.  Pt pain overall less irritable.  Repeated cues for \"quiet\" landing and improving knee valgus.     Tolerated treatment well. Patient demonstrated fatigue post treatment, exhibited good technique with therapeutic exercises, and would benefit from continued PT      Plan: Continue per plan of care.      Precautions: see chart     Visit/Unit Tracking  AUTH Status:  Date               [employer]/Medicaid Used 1/10 2/10                Remaining                                            Manuals                                                               Neuro Re-Ed            Single leg,  No shoe,  Airex            Tandem-DB pass nv           Lateral walk-  [place] 10#    3x10           Toe walk 15# x 2    x6           Heel walk             Single leg land     From box  10\" landing  Single leg    3 x 5 ea          Grapevines  (Speed walking)            Lateral shuffle  3 x 6          B skip (shuffling)            Bony hop   3 x 6    Hop over line    Emphasiz  Landing mechanics                                              Ther Ex            Band ankle inv            Toe yoga            Short foot iso            Tib post heel raise 10#    3x10 10#    3x10          Bent knee heel raise            Bike/TM  10'    L          Great toe flexion-band          "   Leg Press  95#    3x10 95#    3x10                                                Mt climber at wall 3x15  nv            elevated heel squat  20#  3x10                       Ther Activity            Gait analysis:  Runing            A skip            B skip            Squat                                                      Gait Training                                                              Modalities

## 2024-05-17 ENCOUNTER — OFFICE VISIT (OUTPATIENT)
Dept: PHYSICAL THERAPY | Age: 24
End: 2024-05-17
Payer: COMMERCIAL

## 2024-05-17 DIAGNOSIS — M76.822 POSTERIOR TIBIALIS TENDINITIS OF BOTH LOWER EXTREMITIES: ICD-10-CM

## 2024-05-17 DIAGNOSIS — M79.671 BILATERAL LEG AND FOOT PAIN: Primary | ICD-10-CM

## 2024-05-17 DIAGNOSIS — M79.604 BILATERAL LEG AND FOOT PAIN: Primary | ICD-10-CM

## 2024-05-17 DIAGNOSIS — M21.42 PES PLANUS OF BOTH FEET: ICD-10-CM

## 2024-05-17 DIAGNOSIS — M21.41 PES PLANUS OF BOTH FEET: ICD-10-CM

## 2024-05-17 DIAGNOSIS — M79.672 BILATERAL LEG AND FOOT PAIN: Primary | ICD-10-CM

## 2024-05-17 DIAGNOSIS — M76.821 POSTERIOR TIBIALIS TENDINITIS OF BOTH LOWER EXTREMITIES: ICD-10-CM

## 2024-05-17 DIAGNOSIS — M79.605 BILATERAL LEG AND FOOT PAIN: Primary | ICD-10-CM

## 2024-05-17 PROCEDURE — 97110 THERAPEUTIC EXERCISES: CPT | Performed by: PHYSICAL THERAPIST

## 2024-05-17 PROCEDURE — 97112 NEUROMUSCULAR REEDUCATION: CPT | Performed by: PHYSICAL THERAPIST

## 2024-05-17 NOTE — PROGRESS NOTES
"Daily Note     Today's date: 2024  Patient name: Nanda Bernardo  : 2000  MRN: [id number]  Referring provider: Yvan De Santiago*  Dx:   Encounter Diagnosis     ICD-10-CM    1. Bilateral leg and foot pain  M79.604     M79.605     M79.671     M79.672       2. Pes planus of both feet  M21.41     M21.42       3. Posterior tibialis tendinitis of both lower extremities  M76.821     M76.822           Start Time: 09  Stop Time: 1020  Total time in clinic (min): 50 minutes    Subjective: Pt reports improvements with symptoms.       Objective: See treatment diary below      Assessment: Tolerated treatment well. Pt's POC was progressed to include more plymometric interventions. Patient required education to improve coordination during more difficult interventions.  Patient demonstrated fatigue post treatment and would benefit from continued PT      Plan: Continue per plan of care.      Precautions: see chart     Visit/Unit Tracking  AUTH Status:  Date              [employer] Used 1/10 2/10 3/10               Remaining                                           Manuals                                                               Neuro Re-Ed            Single leg,  No shoe,  Airex            Tandem-DB pass nv           Lateral walk-  [place] 10#    3x10           Toe walk 15# x 2    x6           Heel walk             Single leg land     From box  10\" landing  Single leg    3 x 5 ea          Grapevines  (Speed walking)            Lateral shuffle            B skip (shuffling)            Bony hop   3 x 6    Hop over line    Emphasiz  Landing mechanics                                              Ther Ex            Band ankle inv            Toe yoga            Short foot iso            Tib post heel raise 10#    3x10 10#    3x10          Bent knee heel raise            Bike/TM  10'    L          Great toe flexion-band            Leg Press  95#    3x10 " 95#    3x10          Bosu Squats  10x. Difficulty            Foam squats   2x10                       Mt climber at wall 3x15  nv            elevated heel squat  20#  3x10                       Ther Activity            Gait analysis:  Runing            A skip            B skip            Squat                                                      Gait Training                                                              Modalities

## 2024-05-20 ENCOUNTER — OFFICE VISIT (OUTPATIENT)
Dept: PHYSICAL THERAPY | Age: 24
End: 2024-05-20
Payer: COMMERCIAL

## 2024-05-20 DIAGNOSIS — M76.822 POSTERIOR TIBIALIS TENDINITIS OF BOTH LOWER EXTREMITIES: ICD-10-CM

## 2024-05-20 DIAGNOSIS — M21.41 PES PLANUS OF BOTH FEET: ICD-10-CM

## 2024-05-20 DIAGNOSIS — M79.605 BILATERAL LEG AND FOOT PAIN: Primary | ICD-10-CM

## 2024-05-20 DIAGNOSIS — M79.604 BILATERAL LEG AND FOOT PAIN: Primary | ICD-10-CM

## 2024-05-20 DIAGNOSIS — M21.42 PES PLANUS OF BOTH FEET: ICD-10-CM

## 2024-05-20 DIAGNOSIS — M79.672 BILATERAL LEG AND FOOT PAIN: Primary | ICD-10-CM

## 2024-05-20 DIAGNOSIS — M79.671 BILATERAL LEG AND FOOT PAIN: Primary | ICD-10-CM

## 2024-05-20 DIAGNOSIS — M76.821 POSTERIOR TIBIALIS TENDINITIS OF BOTH LOWER EXTREMITIES: ICD-10-CM

## 2024-05-20 PROCEDURE — 97112 NEUROMUSCULAR REEDUCATION: CPT

## 2024-05-20 PROCEDURE — 97110 THERAPEUTIC EXERCISES: CPT

## 2024-05-20 NOTE — PROGRESS NOTES
"Daily Note     Today's date: 2024  Patient name: Nanda Bernardo  : 2000  MRN: [id number]  Referring provider: Yvan De Santiago*  Dx:   No diagnosis found.      Start Time: 930  Stop Time: 1015  Total time in clinic (min): 45 minutes    Subjective: Pt reports improvements with symptoms.       Objective: See treatment diary below      Assessment: Pt is progressing towards her goals of running and return to normal function.  She has trouble with pain during prolonged standing. Her pain is less irritable overall.  She continued hopping and balance interventions to challenge stability during running.  Educated pt on proper footwear as well as she does not wear supportive shoes typically.  Tolerated treatment well. Patient required education to improve coordination during more difficult interventions.  Patient demonstrated fatigue post treatment and would benefit from continued PT      Plan: Continue per plan of care.      Precautions: see chart     Visit/Unit Tracking  AUTH Status:  Date             [employer] Used 1/10 2/10 3/10 4/10              Remaining  12/24 13/24 14/24 15/24                                        Manuals                                                              Neuro Re-Ed            Single leg,  No shoe,  Airex            Tandem-DB pass nv           Lateral walk-  [place] 10#    3x10           Toe walk 15# x 2    x6           Heel walk             Single leg land     From box  10\" landing  Single leg    3 x 5 ea Lo mat        3x5         Grapevines  (Speed walking)            Lateral shuffle            B skip (shuffling)            Bony hop   3 x 6    Hop over line    Emphasiz  Landing mechanics          Double leg   Quick hops   20\"    x4                                 Ther Ex            Band ankle inv            Toe yoga            Short foot iso            Tib post heel raise 10#    3x10 10#    3x10          Bent " knee heel raise            Bike/TM  10'    L TM    10'         Great toe flexion-band            Leg Press  95#    3x10 95#    3x10 110#    3x15         Bosu Squats  10x. Difficulty            Foam squats   2x10                       Mt climber at wall 3x15  nv            elevated heel squat  20#  3x10 20#    3x10                      Ther Activity            Gait analysis:  Runing            A skip            B skip            Squat                                                      Gait Training                                                              Modalities

## 2024-05-23 ENCOUNTER — OFFICE VISIT (OUTPATIENT)
Dept: PHYSICAL THERAPY | Age: 24
End: 2024-05-23
Payer: COMMERCIAL

## 2024-05-23 DIAGNOSIS — M79.672 BILATERAL LEG AND FOOT PAIN: Primary | ICD-10-CM

## 2024-05-23 DIAGNOSIS — M79.604 BILATERAL LEG AND FOOT PAIN: Primary | ICD-10-CM

## 2024-05-23 DIAGNOSIS — M79.671 BILATERAL LEG AND FOOT PAIN: Primary | ICD-10-CM

## 2024-05-23 DIAGNOSIS — M21.41 PES PLANUS OF BOTH FEET: ICD-10-CM

## 2024-05-23 DIAGNOSIS — M21.42 PES PLANUS OF BOTH FEET: ICD-10-CM

## 2024-05-23 DIAGNOSIS — M76.821 POSTERIOR TIBIALIS TENDINITIS OF BOTH LOWER EXTREMITIES: ICD-10-CM

## 2024-05-23 DIAGNOSIS — M79.605 BILATERAL LEG AND FOOT PAIN: Primary | ICD-10-CM

## 2024-05-23 DIAGNOSIS — M76.822 POSTERIOR TIBIALIS TENDINITIS OF BOTH LOWER EXTREMITIES: ICD-10-CM

## 2024-05-23 PROCEDURE — 97110 THERAPEUTIC EXERCISES: CPT

## 2024-05-23 PROCEDURE — 97112 NEUROMUSCULAR REEDUCATION: CPT

## 2024-05-23 NOTE — PROGRESS NOTES
"Daily Note     Today's date: 2024  Patient name: Nanda Bernardo  : 2000  MRN: [id number]  Referring provider: Yvan De Santiago*  Dx: No diagnosis found.               Subjective: pt reports no change in symptoms.  Doing well overall.       Objective: See treatment diary below      Assessment: Patient tolerated treatment session well. She needed maximal cues for pace and performance of activities.  She did not wear adequate      Plan: Continue per POC. Increase reps/resistance as tolerated.      Precautions: see chart     Visit/Unit Tracking  AUTH Status:  Date            [employer] Used 1/10 2/10 3/10 4/10 5/10             Remaining  12/24 13/24 14/24 15/24 16/24                                       Manuals                                                             Neuro Re-Ed            Single leg,  No shoe,  Airex            Tandem-DB pass nv           Lateral walk-  [place] 10#    3x10           Toe walk 15# x 2    x6           Heel walk             Single leg land     From box  10\" landing  Single leg    3 x 5 ea Lo mat        3x5 Low mat      3x5        Grapevines  (Speed walking)    x5        Lateral shuffle            B skip (shuffling)            Bony hop   3 x 6    Hop over line    Emphasiz  Landing mechanics  3x6        Double leg   Quick hops   20\"    x4                                 Ther Ex            Band ankle inv            Toe yoga            Short foot iso            Tib post heel raise 10#    3x10 10#    3x10          Bent knee heel raise            Bike/TM  10'    L TM    10' Tm10'            Great toe flexion-band            Leg Press  95#    3x10 95#    3x10 110#    3x15 110#    3x15        Bosu Squats  10x. Difficulty            Foam squats   2x10                       Mt climber at wall 3x15  nv            elevated heel squat  20#  3x10 20#    3x10         Elevated toes squat    20#    3x10                  "   Ther Activity            Gait analysis:  Runing            A skip            B skip            Squat                                                      Gait Training                                                              Modalities